# Patient Record
Sex: MALE | Race: WHITE | NOT HISPANIC OR LATINO | Employment: OTHER | URBAN - METROPOLITAN AREA
[De-identification: names, ages, dates, MRNs, and addresses within clinical notes are randomized per-mention and may not be internally consistent; named-entity substitution may affect disease eponyms.]

---

## 2023-07-11 ENCOUNTER — OFFICE VISIT (OUTPATIENT)
Dept: FAMILY MEDICINE | Facility: CLINIC | Age: 73
End: 2023-07-11
Payer: MEDICARE

## 2023-07-11 VITALS
OXYGEN SATURATION: 97 % | BODY MASS INDEX: 26.53 KG/M2 | WEIGHT: 179.1 LBS | HEIGHT: 69 IN | RESPIRATION RATE: 16 BRPM | SYSTOLIC BLOOD PRESSURE: 132 MMHG | DIASTOLIC BLOOD PRESSURE: 76 MMHG | TEMPERATURE: 97 F | HEART RATE: 72 BPM

## 2023-07-11 DIAGNOSIS — M25.522 LEFT ELBOW PAIN: ICD-10-CM

## 2023-07-11 DIAGNOSIS — Z23 NEED FOR SHINGLES VACCINE: ICD-10-CM

## 2023-07-11 DIAGNOSIS — R21 RASH: Primary | ICD-10-CM

## 2023-07-11 DIAGNOSIS — Z12.11 SCREEN FOR COLON CANCER: ICD-10-CM

## 2023-07-11 PROBLEM — R73.03 PREDIABETES: Status: ACTIVE | Noted: 2020-12-20

## 2023-07-11 PROBLEM — D17.9 LIPOMA: Status: ACTIVE | Noted: 2018-02-16

## 2023-07-11 PROBLEM — G14: Status: ACTIVE | Noted: 2018-02-22

## 2023-07-11 PROBLEM — Z87.891 HX OF SMOKING: Status: ACTIVE | Noted: 2018-02-16

## 2023-07-11 PROCEDURE — 99203 OFFICE O/P NEW LOW 30 MIN: CPT

## 2023-07-11 RX ORDER — TRIAMCINOLONE ACETONIDE 1 MG/G
CREAM TOPICAL 2 TIMES DAILY
Qty: 30 G | Refills: 0 | Status: SHIPPED | OUTPATIENT
Start: 2023-07-11 | End: 2024-06-14

## 2023-07-11 ASSESSMENT — ENCOUNTER SYMPTOMS: BACK PAIN: 1

## 2023-07-11 NOTE — PROGRESS NOTES
Assessment & Plan     Rash  Slightly raised scaly patches on upper back. Is scheduled with dermatology next week. Itching is the most bothersome symptom for him so will try topical steroid and follow up with derm.  - triamcinolone (KENALOG) 0.1 % external cream  Dispense: 30 g; Refill: 0    Left elbow pain  Started after overuse/strain with continued use. Discussed use of NSAIDS, rest, ice. Declined PT referral but will consider after trial of conservative measures.    Screen for colon cancer  - Colonoscopy Screening  Referral    Need for shingles vaccine  - zoster vaccine recombinant adjuvanted (SHINGRIX) injection  Dispense: 0.5 mL; Refill: 0    Plan to follow up for preventative care visit.     MICHELLE Loo CNP  [facility]   Edin is a 73 year old, presenting for the following health issues:  Establish Care (New patient, establish care), Derm Problem (Rash on back and has a growth on his back), Constipation (Chronic problem-most of life), Back Pain (Back pain X 1 month), and Elbow Pain (Left elbow pain)        7/11/2023     4:12 PM   Additional Questions   Roomed by Liberty   Accompanied by N/A         7/11/2023     4:13 PM   Patient Reported Additional Medications   Patient reports taking the following new medications OTC stool softener     Back Pain     Elbow Pain    History of Present Illness       Back Pain:  He presents for follow up of back pain. Patient's back pain is a recurring problem.  Location of back pain:  Left lower back  Description of back pain: stabbing  Back pain spreads: nowhere    Since patient first noticed back pain, pain is: gradually improving  Does back pain interfere with his job:  No      He eats 4 or more servings of fruits and vegetables daily.He consumes 4 sweetened beverage(s) daily.He exercises with enough effort to increase his heart rate 30 to 60 minutes per day.  He exercises with enough effort to increase his heart rate  "7 days per week.   He is taking medications regularly.       Edin presents to establish care and with concerns. He moved from [place] to [place] this past fall.    Complaining of a rash on back started a few months ago. He has not tried anything to help with the rash. Not painful but itchy. Any type of pressure on the area increases the itchy sensation. He has an appointment with [facility] next week to discuss this.     He is also complaining of left elbow pain. No specific injury but he reports it started after shoveling snow and chopping up ice this winter. More painful when he carries something heavy. 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 He is able to continue strength training (modified push ups, weight lifting). He has not tried anything to help with this pain.     Review of Systems   Musculoskeletal: Positive for back pain.      CONSTITUTIONAL: NEGATIVE for fever, chills, change in weight  ENT/MOUTH: NEGATIVE for ear, mouth and throat problems  RESP: NEGATIVE for significant cough or SOB  CV: NEGATIVE for chest pain, palpitations or peripheral edema      Objective    /76 (BP Location: Left arm, Patient Position: Sitting, Cuff Size: Adult Large)   Pulse 72   Temp 97  F (36.1  C) (Tympanic)   Resp 16   Ht 1.753 m (5' 9\")   Wt 81.2 kg (179 lb 1.6 oz)   SpO2 97%   BMI 26.45 kg/m    Body mass index is 26.45 kg/m .     Physical Exam     GENERAL: healthy, alert and no distress  NECK: no adenopathy, no asymmetry, masses, or scars and thyroid normal to palpation  RESP: lungs clear to auscultation - no rales, rhonchi or wheezes  CV: regular rate and rhythm, normal S1 S2, no S3 or S4, no murmur, click or rub, no peripheral edema and peripheral pulses strong  MS: no gross musculoskeletal defects noted, no edema. Full ROM of left elbow. Full strength. No erythema, edema.   SKIN: Dry raised pink scaly patches on upper back. Surrounding skin intact.       "

## 2023-09-02 ENCOUNTER — HEALTH MAINTENANCE LETTER (OUTPATIENT)
Age: 73
End: 2023-09-02

## 2023-09-19 ENCOUNTER — OFFICE VISIT (OUTPATIENT)
Dept: FAMILY MEDICINE | Facility: CLINIC | Age: 73
End: 2023-09-19
Payer: MEDICARE

## 2023-09-19 VITALS
BODY MASS INDEX: 27.43 KG/M2 | HEART RATE: 75 BPM | SYSTOLIC BLOOD PRESSURE: 119 MMHG | DIASTOLIC BLOOD PRESSURE: 78 MMHG | HEIGHT: 68 IN | RESPIRATION RATE: 12 BRPM | TEMPERATURE: 98.1 F | OXYGEN SATURATION: 97 % | WEIGHT: 181 LBS

## 2023-09-19 DIAGNOSIS — Z12.5 SCREENING FOR PROSTATE CANCER: ICD-10-CM

## 2023-09-19 DIAGNOSIS — S46.001S INJURY OF RIGHT ROTATOR CUFF, SEQUELA: ICD-10-CM

## 2023-09-19 DIAGNOSIS — Z87.891 HISTORY OF SMOKING: ICD-10-CM

## 2023-09-19 DIAGNOSIS — Z13.6 SCREENING FOR AAA (ABDOMINAL AORTIC ANEURYSM): ICD-10-CM

## 2023-09-19 DIAGNOSIS — Z23 NEED FOR SHINGLES VACCINE: ICD-10-CM

## 2023-09-19 DIAGNOSIS — R29.898 LEFT LEG WEAKNESS: ICD-10-CM

## 2023-09-19 DIAGNOSIS — R73.03 PREDIABETES: ICD-10-CM

## 2023-09-19 DIAGNOSIS — L20.84 INTRINSIC ECZEMA: ICD-10-CM

## 2023-09-19 DIAGNOSIS — Z00.00 ENCOUNTER FOR MEDICARE ANNUAL WELLNESS EXAM: Primary | ICD-10-CM

## 2023-09-19 DIAGNOSIS — Z11.59 NEED FOR HEPATITIS C SCREENING TEST: ICD-10-CM

## 2023-09-19 DIAGNOSIS — Z13.220 SCREENING FOR LIPID DISORDERS: ICD-10-CM

## 2023-09-19 DIAGNOSIS — M25.522 LEFT ELBOW PAIN: ICD-10-CM

## 2023-09-19 DIAGNOSIS — Z23 NEED FOR INFLUENZA VACCINATION: ICD-10-CM

## 2023-09-19 DIAGNOSIS — Z12.11 SCREEN FOR COLON CANCER: ICD-10-CM

## 2023-09-19 LAB
ERYTHROCYTE [DISTWIDTH] IN BLOOD BY AUTOMATED COUNT: 13.1 % (ref 10–15)
HBA1C MFR BLD: 6.2 % (ref 0–5.6)
HCT VFR BLD AUTO: 42.2 % (ref 40–53)
HGB BLD-MCNC: 14.3 G/DL (ref 13.3–17.7)
MCH RBC QN AUTO: 30.6 PG (ref 26.5–33)
MCHC RBC AUTO-ENTMCNC: 33.9 G/DL (ref 31.5–36.5)
MCV RBC AUTO: 90 FL (ref 78–100)
PLATELET # BLD AUTO: 222 10E3/UL (ref 150–450)
RBC # BLD AUTO: 4.67 10E6/UL (ref 4.4–5.9)
WBC # BLD AUTO: 5.6 10E3/UL (ref 4–11)

## 2023-09-19 PROCEDURE — G0008 ADMIN INFLUENZA VIRUS VAC: HCPCS

## 2023-09-19 PROCEDURE — G0438 PPPS, INITIAL VISIT: HCPCS

## 2023-09-19 PROCEDURE — 86803 HEPATITIS C AB TEST: CPT

## 2023-09-19 PROCEDURE — 36415 COLL VENOUS BLD VENIPUNCTURE: CPT

## 2023-09-19 PROCEDURE — G0103 PSA SCREENING: HCPCS

## 2023-09-19 PROCEDURE — 85027 COMPLETE CBC AUTOMATED: CPT

## 2023-09-19 PROCEDURE — 83036 HEMOGLOBIN GLYCOSYLATED A1C: CPT

## 2023-09-19 PROCEDURE — 99213 OFFICE O/P EST LOW 20 MIN: CPT | Mod: 25

## 2023-09-19 PROCEDURE — 90662 IIV NO PRSV INCREASED AG IM: CPT

## 2023-09-19 PROCEDURE — 80061 LIPID PANEL: CPT

## 2023-09-19 PROCEDURE — 80053 COMPREHEN METABOLIC PANEL: CPT

## 2023-09-19 RX ORDER — TRIAMCINOLONE ACETONIDE 1 MG/G
OINTMENT TOPICAL 2 TIMES DAILY PRN
COMMUNITY
Start: 2023-08-07 | End: 2024-06-14

## 2023-09-19 ASSESSMENT — ENCOUNTER SYMPTOMS
HEMATOCHEZIA: 0
ABDOMINAL PAIN: 0
HEARTBURN: 0
PARESTHESIAS: 0
FEVER: 0
ARTHRALGIAS: 1
DYSURIA: 0
HEMATURIA: 0
EYE PAIN: 0
MYALGIAS: 1
CHILLS: 0
WEAKNESS: 1
DIARRHEA: 0
SHORTNESS OF BREATH: 0
COUGH: 0
HEADACHES: 0
PALPITATIONS: 0
NAUSEA: 0
DIZZINESS: 0
SORE THROAT: 0
NERVOUS/ANXIOUS: 0
JOINT SWELLING: 0
CONSTIPATION: 1

## 2023-09-19 ASSESSMENT — ACTIVITIES OF DAILY LIVING (ADL): CURRENT_FUNCTION: NO ASSISTANCE NEEDED

## 2023-09-19 NOTE — NURSING NOTE
Hpi Title: Evaluation of Skin Lesions Prior to immunization administration, verified patients identity using patient s name and date of birth. Please see Immunization Activity for additional information.     Screening Questionnaire for Adult Immunization    Are you sick today?   No   Do you have allergies to medications, food, a vaccine component or latex?   No   Have you ever had a serious reaction after receiving a vaccination?   No   Do you have a long-term health problem with heart, lung, kidney, or metabolic disease (e.g., diabetes), asthma, a blood disorder, no spleen, complement component deficiency, a cochlear implant, or a spinal fluid leak?  Are you on long-term aspirin therapy?   No   Do you have cancer, leukemia, HIV/AIDS, or any other immune system problem?   No   Do you have a parent, brother, or sister with an immune system problem?   Yes   In the past 3 months, have you taken medications that affect  your immune system, such as prednisone, other steroids, or anticancer drugs; drugs for the treatment of rheumatoid arthritis, Crohn s disease, or psoriasis; or have you had radiation treatments?   No   Have you had a seizure, or a brain or other nervous system problem?   No   During the past year, have you received a transfusion of blood or blood    products, or been given immune (gamma) globulin or antiviral drug?   No   For women: Are you pregnant or is there a chance you could become       pregnant during the next month?   No   Have you received any vaccinations in the past 4 weeks?   No     Immunization questionnaire was positive for at least one answer.  Notified yes.      Patient instructed to remain in clinic for 15 minutes afterwards, and to report any adverse reactions.     Screening performed by Cristy Edouard MA on 9/19/2023 at 5:04 PM.        Year Removed: 1900 Have Your Spot(S) Been Treated In The Past?: has not been treated Hpi Title: Evaluation of a Skin Lesion Year Removed: 1900

## 2023-09-19 NOTE — PATIENT INSTRUCTIONS
Patient Education   Personalized Prevention Plan  You are due for the preventive services outlined below.  Your care team is available to assist you in scheduling these services.  If you have already completed any of these items, please share that information with your care team to update in your medical record.  Health Maintenance Due   Topic Date Due     Colorectal Cancer Screening  Never done     Hepatitis C Screening  Never done     Cholesterol Lab  Never done     Zoster (Shingles) Vaccine (1 of 2) Never done     AORTIC ANEURYSM SCREENING (SYSTEM ASSIGNED)  Never done     COVID-19 Vaccine (3 - Pfizer series) 04/08/2021     Flu Vaccine (1) 09/01/2023     Preventing Falls: Care Instructions    Talk to your doctor about the medicines you take. Ask if any of them increase the risk of falls and whether they can be changed or stopped.   Try to exercise regularly. It can help improve your strength and balance. This can help lower your risk of falling.     Practice fall safety and prevention.    Wear low-heeled shoes that fit well and give your feet good support. Talk to your doctor if you have foot problems that make this hard.  Carry a cellphone or wear a medical alert device that you can use to call for help.  Use stepladders instead of chairs to reach high objects. Don't climb if you're at risk for falls. Ask for help, if needed.  Wear the correct eyeglasses, if you need them.    Make your home safer.    Remove rugs, cords, clutter, and furniture from walkways.  Keep your house well lit. Use night-lights in hallways and bathrooms.  Install and use sturdy handrails on stairways.  Wear nonskid footwear, even inside. Don't walk barefoot or in socks without shoes.    Be safe outside.    Use handrails, curb cuts, and ramps whenever possible.  Keep your hands free by using a shoulder bag or backpack.  Try to walk in well-lit areas. Watch out for uneven ground, changes in pavement, and debris.  Be careful in the winter.  "Walk on the grass or gravel when sidewalks are slippery. Use de-icer on steps and walkways. Add non-slip devices to shoes.    Put grab bars and nonskid mats in your shower or tub and near the toilet. Try to use a shower chair or bath bench when bathing.   Get into a tub or shower by putting in your weaker leg first. Get out with your strong side first. Have a phone or medical alert device in the bathroom with you.   Where can you learn more?  Go to https://www.[employer].net/patiented  Enter G117 in the search box to learn more about \"Preventing Falls: Care Instructions.\"  Current as of: November 9, 2022               Content Version: 13.7    0987-1072 [employer].   Care instructions adapted under license by your healthcare professional. If you have questions about a medical condition or this instruction, always ask your healthcare professional. [employer] disclaims any warranty or liability for your use of this information.      How to Get Up Safely After a Fall: Care Instructions  Overview     If you have injuries, health problems, or other reasons that may make it easy for you to fall at home, it is a good idea to learn how to get up safely after a fall. Learning how to get up correctly can help you avoid making an injury worse.  Also, knowing what to do if you cannot get up can help you stay safe until help arrives.  Follow-up care is a key part of your treatment and safety. Be sure to make and go to all appointments, and call your doctor if you are having problems. It's also a good idea to know your test results and keep a list of the medicines you take.  How can you care for yourself after a fall?  If you think you can get up  First lie still for a few minutes and think about how you feel. If your body feels okay and you think you can get up safely, follow the rest of the steps below:  Look for a chair or other piece of furniture that is close to you.  Roll onto your side and rest. " Roll by turning your head in the direction you want to roll, move your shoulder and arm, then hip and leg in the same direction.  Lie still for a moment to let your blood pressure adjust.  Slowly push your upper body up, lift your head, and take a moment to rest.  Slowly get up on your hands and knees, and crawl to the chair or other stable piece of furniture.  Put your hands on the chair.  Move one foot forward, and place it flat on the floor. Your other leg should be bent with the knee on the floor.  Rise slowly, turn your body, and sit in the chair. Stay seated for a bit and think about how you feel. Call for help. Even if you feel okay, let someone know what happened to you. You might not know that you have a serious injury.  If you cannot get up  If you think you are injured after a fall or you cannot get up, try not to panic.  Call out for help.  If you have a phone within reach or you have an emergency call device, use it to call for help.  If you do not have a phone within reach, try to slide yourself toward it. If you cannot get to the phone, try to slide toward a door or window or a place where you think you can be heard.  Chaves or use an object to make noise so someone might hear you.  If you can reach something that you can use for a pillow, place it under your head. Try to stay warm by covering yourself with a blanket or clothing while you wait for help.  When should you call for help?   Call 911 anytime you think you may need emergency care. For example, call if:    You passed out (lost consciousness).     You cannot get up after a fall.     You have severe pain.   Call your doctor now or seek immediate medical care if:    You have new or worse pain.     You are dizzy or lightheaded.     You hit your head.   Watch closely for changes in your health, and be sure to contact your doctor if:    You do not get better as expected.   Where can you learn more?  Go to https://www.healthwise.net/patiented  Enter  "G513 in the search box to learn more about \"How to Get Up Safely After a Fall: Care Instructions.\"  Current as of: November 14, 2022               Content Version: 13.7 2006-2023 [employer].   Care instructions adapted under license by your healthcare professional. If you have questions about a medical condition or this instruction, always ask your healthcare professional. [employer] disclaims any warranty or liability for your use of this information.         "

## 2023-09-19 NOTE — PROGRESS NOTES
SUBJECTIVE:   Edin is a 73 year old who presents for Preventive Visit.      9/19/2023     3:58 PM   Additional Questions   Roomed by lesli stinson   Accompanied by alone         9/19/2023     3:58 PM   Patient Reported Additional Medications   Patient reports taking the following new medications none       Are you in the first 12 months of your Medicare coverage?  No    HPI    Edin presents today for an annual wellness. He is retired.  For exercise he has been walking the dog, going to gym, sits ups, and push ups. Has been feeling more weak/unsteady, especially  his left leg as he had polio as a child and it affected this leg.    Was seen previously for left elbow injury after oversue shoveling snow this past winter. Continues to bother him at times    He has been seeing dermatology for a rash that he developed. He is seeing dermatology with next appointment tomorrow.     Have you ever done Advance Care Planning? (For example, a Health Directive, POLST, or a discussion with a medical provider or your loved ones about your wishes): No, advance care planning information given to patient to review.  Patient declined advance care planning discussion at this time.      Fall risk  Fallen 2 or more times in the past year?: Yes  Any fall with injury in the past year?: No    Cognitive Screening   1) Repeat 3 items (Leader, Season, Table)    2) Clock draw: NORMAL  3) 3 item recall: Recalls 3 objects  Results: 3 items recalled: COGNITIVE IMPAIRMENT LESS LIKELY    Mini-CogTM Copyright S [employer]. Licensed by the author for use in [facility]; reprinted with permission (keysha@.[facility]). All rights reserved.        Reviewed and updated as needed this visit by clinical staff   Tobacco  Allergies  Meds  Problems  Med Hx  Surg Hx  Fam Hx          Reviewed and updated as needed this visit by Provider   Tobacco  Allergies  Meds  Problems  Med Hx  Surg Hx  Fam Hx         Social History     Tobacco Use    Smoking  status: Never    Smokeless tobacco: Never   Substance Use Topics    Alcohol use: Not on file           9/19/2023     3:52 PM   Alcohol Use   Prescreen: >3 drinks/day or >7 drinks/week? No     Do you have a current opioid prescription? No  Do you use any other controlled substances or medications that are not prescribed by a provider? None  {Provider  If there are gaps in the social history shown above, please follow the link and refresh the note Link to Social and Substance History     Current providers sharing in care for this patient include:   Patient Care Team:  No Ref-Primary, Physician as PCP - Leslie Donaldson APRN CNP as Assigned PCP    The following health maintenance items are reviewed in Epic and correct as of today:  Health Maintenance   Topic Date Due    COLORECTAL CANCER SCREENING  Never done    HEPATITIS C SCREENING  Never done    LIPID  Never done    ZOSTER IMMUNIZATION (1 of 2) Never done    AORTIC ANEURYSM SCREENING (SYSTEM ASSIGNED)  Never done    COVID-19 Vaccine (3 - Pfizer series) 04/08/2021    INFLUENZA VACCINE (1) 09/01/2023    ANNUAL REVIEW OF HM ORDERS  07/11/2024    MEDICARE ANNUAL WELLNESS VISIT  09/19/2024    FALL RISK ASSESSMENT  09/19/2024    DTAP/TDAP/TD IMMUNIZATION (2 - Td or Tdap) 02/09/2028    ADVANCE CARE PLANNING  09/19/2028    PHQ-2 (once per calendar year)  Completed    Pneumococcal Vaccine: 65+ Years  Completed    IPV IMMUNIZATION  Aged Out    HPV IMMUNIZATION  Aged Out    MENINGITIS IMMUNIZATION  Aged Out     BP Readings from Last 3 Encounters:   09/19/23 119/78   07/11/23 132/76    Wt Readings from Last 3 Encounters:   09/19/23 82.1 kg (181 lb)   07/11/23 81.2 kg (179 lb 1.6 oz)                          Review of Systems  Constitutional, HEENT, cardiovascular, pulmonary, gi and gu systems are negative, except as otherwise noted.    OBJECTIVE:   /78 (BP Location: Left arm, Patient Position: Sitting, Cuff Size: Adult Regular)   Pulse 75   Temp 98.1  F (36.7  C)  "(Oral)   Resp 12   Ht 1.727 m (5' 8\")   Wt 82.1 kg (181 lb)   SpO2 97%   BMI 27.52 kg/m   Estimated body mass index is 27.52 kg/m  as calculated from the following:    Height as of this encounter: 1.727 m (5' 8\").    Weight as of this encounter: 82.1 kg (181 lb).  Physical Exam  GENERAL: healthy, alert and no distress  EYES: Eyes grossly normal to inspection, PERRL and conjunctivae and sclerae normal  HENT: ear canals and TM's normal, nose and mouth without ulcers or lesions  NECK: no adenopathy, no asymmetry, masses, or scars and thyroid normal to palpation  RESP: lungs clear to auscultation - no rales, rhonchi or wheezes  CV: regular rate and rhythm, normal S1 S2, no S3 or S4, no murmur, click or rub, no peripheral edema and peripheral pulses strong  ABDOMEN: soft, nontender, no hepatosplenomegaly, no masses and bowel sounds normal  MS: no gross musculoskeletal defects noted, no edema  SKIN: Erythematous scaling patches on elbows, back.   NEURO: Normal strength and tone, mentation intact and speech normal  PSYCH: mentation appears normal, affect normal/bright        ASSESSMENT / PLAN:   Edin was seen today for wellness visit and recheck medication.    Diagnoses and all orders for this visit:    1. Encounter for Medicare annual wellness exam  Preventative exam completed today. Discussed healthy lifestyle recommendations of getting 150 minutes of exercise weekly and eating a healthy diet. Reviewed and updated health maintenance. Immunizations/Labs completed today. Follow up in one year for AWV.   - Lipid panel reflex to direct LDL Non-fasting; Future  - INFLUENZA VACCINE 65+ (FLUZONE HD)  - Lipid panel reflex to direct LDL Non-fasting  - CBC with platelets  - Comprehensive metabolic panel (BMP + Alb, Alk Phos, ALT, AST, Total. Bili, TP)    2. Intrinsic eczema  Seeing dermatology. Currently prescribed triamcinolone. Counseled on keeping his skin moisturized. Has follow up with dermatology tomorrow.     3. " "Left leg weakness  History of polio with left leg weakness. He does some strength exercises at the gym but he starting to feel more unsteady. Will refer to PT.  - Physical Therapy Referral; Future    4. Left elbow pain  Probable tendinitis after shoveling snow this winter that continues to flare. Counseled on ice, rest, NSAIDS. PT order placed.  - Physical Therapy Referral; Future    5. Injury of right rotator cuff, sequela  History of rotator cuff injury. Plays the cello. He would like PT to eval this as well. Has had PT for this previously.   - Physical Therapy Referral; Future    6. Need for shingles vaccine  Already had order at pharmacy. Reminded him to complete this.     7. Screening for AAA (abdominal aortic aneurysm)  - Abdominal Aortic Aneurysm Screening/Tracking  - US Abdominal Aorta Imaging; Future    8. Prediabetes  Last A1c 6.1. Check today.   - Hemoglobin A1c    9. Screen for colon cancer  Already has referral for colonoscopy. Did give him the phone number so that he can call and schedule this.     10. Need for hepatitis C screening test  - Hepatitis C Screen Reflex to HCV RNA Quant and Genotype    11. History of smoking  - Abdominal Aortic Aneurysm Screening/Tracking    12. Screening for prostate cancer  - PSA, screen    13. Screening for lipid disorders  - Lipid panel reflex to direct LDL Non-fasting    14. Need for influenza vaccination  - INFLUENZA VACCINE 65+ (FLUZONE HD)               COUNSELING:  Reviewed preventive health counseling, as reflected in patient instructions       Consider AAA screening for ages 65-75 and smoking history       Regular exercise       Healthy diet/nutrition       Dental care       Fall risk prevention       Colon cancer screening       Prostate cancer screening      BMI:   Estimated body mass index is 27.52 kg/m  as calculated from the following:    Height as of this encounter: 1.727 m (5' 8\").    Weight as of this encounter: 82.1 kg (181 lb).       He reports that he " has never smoked. He has never used smokeless tobacco.    Appropriate preventive services were discussed with this patient, including applicable screening as appropriate for cardiovascular disease, diabetes, osteopenia/osteoporosis, and glaucoma.  As appropriate for age/gender, discussed screening for colorectal cancer, prostate cancer, breast cancer, and cervical cancer. Checklist reviewing preventive services available has been given to the patient.    Reviewed patients plan of care and provided an AVS. The Basic Care Plan (routine screening as documented in Health Maintenance) for Edin meets the Care Plan requirement. This Care Plan has been established and reviewed with the Patient.      MICHELLE Loo [facility]    Identified Health Risks:  I have reviewed Opioid Use Disorder and Substance Use Disorder risk factors and made any needed referrals.   He is at risk for falling and has been provided with information to reduce the risk of falling at home.

## 2023-09-19 NOTE — PROGRESS NOTES
"SUBJECTIVE:   Edin is a 73 year old who presents for Preventive Visit.      9/19/2023     3:58 PM   Additional Questions   Roomed by lesli stinson   Accompanied by alone         9/19/2023     3:58 PM   Patient Reported Additional Medications   Patient reports taking the following new medications none       Are you in the first 12 months of your Medicare coverage?  No    Healthy Habits:     In general, how would you rate your overall health?  Excellent    Frequency of exercise:  6-7 days/week    Duration of exercise:  30-45 minutes    Do you usually eat at least 4 servings of fruit and vegetables a day, include whole grains    & fiber and avoid regularly eating high fat or \"junk\" foods?  Yes    Taking medications regularly:  Not Applicable    Medication side effects:  Not applicable    Ability to successfully perform activities of daily living:  No assistance needed    Home Safety:  Lack of grab bars in the bathroom    Hearing Impairment:  No hearing concerns    In the past 6 months, have you been bothered by leaking of urine?  No    In general, how would you rate your overall mental or emotional health?  Excellent    Additional concerns today:  Yes        Have you ever done Advance Care Planning? (For example, a Health Directive, POLST, or a discussion with a medical provider or your loved ones about your wishes): No, advance care planning information given to patient to review.  Patient declined advance care planning discussion at this time.    {Hearing Test Done (Optional):433036}   Fall risk  { :888417}  {If any of the above assessments are answered yes, consider ordering appropriate referrals (Optional):309475}  Cognitive Screening { :351456}    Do you have sleep apnea, excessive snoring or daytime drowsiness? : no    Reviewed and updated as needed this visit by clinical staff   Tobacco  Allergies  Meds              Reviewed and updated as needed this visit by Provider                 Social History     Tobacco " Use    Smoking status: Never    Smokeless tobacco: Never   Substance Use Topics    Alcohol use: Not on file     {Rooming staff  Click this link to complete the Prescreen if response below is not for today's visit  Alcohol Use Prescreen >3 drinks/day or > 7 drinks/week.  If the prescreen question answer is YES, complete the full AUDIT  :587885}        9/19/2023     3:52 PM   Alcohol Use   Prescreen: >3 drinks/day or >7 drinks/week? No   {add AUDIT responses (Optional) (A score of 7 for adult men is an indication of hazardous drinking; a score of 8 or more is an indication of an alcohol use disorder.  A score of 7 or more for adult women is an indication of hazardous drinking or an alchohol use disorder):309717}  Do you have a current opioid prescription? No  Do you use any other controlled substances or medications that are not prescribed by a provider? None  {Provider  If there are gaps in the social history shown above, please follow the link and refresh the note Link to Social and Substance History :759770}    {Outside tests to abstract? :884544}    {additional problems to add (Optional):085407}    Current providers sharing in care for this patient include: {Rooming staff:  Please update Care Team from storyboard, refresh this note and then delete this statement}  Patient Care Team:  No Ref-Primary, Physician as PCP - Leslie Donaldson APRN CNP as Assigned PCP    The following health maintenance items are reviewed in Epic and correct as of today:  Health Maintenance   Topic Date Due    ADVANCE CARE PLANNING  Never done    COLORECTAL CANCER SCREENING  Never done    HEPATITIS C SCREENING  Never done    LIPID  Never done    ZOSTER IMMUNIZATION (1 of 2) Never done    MEDICARE ANNUAL WELLNESS VISIT  Never done    AORTIC ANEURYSM SCREENING (SYSTEM ASSIGNED)  Never done    COVID-19 Vaccine (3 - Pfizer series) 04/08/2021    INFLUENZA VACCINE (1) 09/01/2023    ANNUAL REVIEW OF HM ORDERS  07/11/2024    FALL RISK  "ASSESSMENT  2024    DTAP/TDAP/TD IMMUNIZATION (2 - Td or Tdap) 2028    PHQ-2 (once per calendar year)  Completed    Pneumococcal Vaccine: 65+ Years  Completed    IPV IMMUNIZATION  Aged Out    HPV IMMUNIZATION  Aged Out    MENINGITIS IMMUNIZATION  Aged Out     {Chronicprobdata (optional):858976}  {Decision Support (Optional):826793}        Review of Systems   Constitutional:  Negative for chills and fever.   HENT:  Negative for congestion, ear pain, hearing loss and sore throat.    Eyes:  Negative for pain and visual disturbance.   Respiratory:  Negative for cough and shortness of breath.    Cardiovascular:  Negative for chest pain, palpitations and peripheral edema.   Gastrointestinal:  Positive for constipation. Negative for abdominal pain, diarrhea, heartburn, hematochezia and nausea.   Genitourinary:  Negative for dysuria, genital sores, hematuria, impotence, penile discharge and urgency.   Musculoskeletal:  Positive for arthralgias and myalgias. Negative for joint swelling.   Skin:  Positive for rash.   Neurological:  Positive for weakness. Negative for dizziness, headaches and paresthesias.   Psychiatric/Behavioral:  Negative for mood changes. The patient is not nervous/anxious.      {ROS COMP (Optional):056717}    OBJECTIVE:   /78 (BP Location: Left arm, Patient Position: Sitting, Cuff Size: Adult Regular)   Pulse 75   Temp 98.1  F (36.7  C) (Oral)   Resp 12   Ht 1.727 m (5' 8\")   Wt 82.1 kg (181 lb)   SpO2 97%   BMI 27.52 kg/m   Estimated body mass index is 27.52 kg/m  as calculated from the following:    Height as of this encounter: 1.727 m (5' 8\").    Weight as of this encounter: 82.1 kg (181 lb).  Physical Exam  {Exam (Optional) :807798}    {Diagnostic Test Results (Optional):770077}    ASSESSMENT / PLAN:   {Diag Picklist:259374}    {Patient advised of split billing (Optional):036654}      COUNSELING:  {Medicare Counselin}      BMI:   Estimated body mass index is 27.52 " "kg/m  as calculated from the following:    Height as of this encounter: 1.727 m (5' 8\").    Weight as of this encounter: 82.1 kg (181 lb).   {Weight Management Plan needed for ACO:226029}      He reports that he has never smoked. He has never used smokeless tobacco.      Appropriate preventive services were discussed with this patient, including applicable screening as appropriate for cardiovascular disease, diabetes, osteopenia/osteoporosis, and glaucoma.  As appropriate for age/gender, discussed screening for colorectal cancer, prostate cancer, breast cancer, and cervical cancer. Checklist reviewing preventive services available has been given to the patient.    Reviewed patients plan of care and provided an AVS. The {CarePlan:431761} for Edin meets the Care Plan requirement. This Care Plan has been established and reviewed with the {PATIENT, FAMILY MEMBER, CAREGIVER:345018}.    {Counseling Resources  [place] Preventive Services Task Force  Cholesterol Screening  Health diet/nutrition  Pooled Cohorts Equation Calculator  [employer]'s MyPlate  ASA Prophylaxis  Lung CA Screening  Osteoporosis prevention/bone health :380353}  {Prostate Cancer Screening  Consider for men 55-69 per guidance from USPSTF :360993}    MICHELLE Loo CNP  [facility]    Identified Health Risks:  {Medicare required documentation of substance and opioid use disorders screening :931807}  "

## 2023-09-20 LAB
ALBUMIN SERPL BCG-MCNC: 4.5 G/DL (ref 3.5–5.2)
ALP SERPL-CCNC: 45 U/L (ref 40–129)
ALT SERPL W P-5'-P-CCNC: 16 U/L (ref 0–70)
ANION GAP SERPL CALCULATED.3IONS-SCNC: 11 MMOL/L (ref 7–15)
AST SERPL W P-5'-P-CCNC: 20 U/L (ref 0–45)
BILIRUB SERPL-MCNC: 0.3 MG/DL
BUN SERPL-MCNC: 13.5 MG/DL (ref 8–23)
CALCIUM SERPL-MCNC: 9.6 MG/DL (ref 8.8–10.2)
CHLORIDE SERPL-SCNC: 105 MMOL/L (ref 98–107)
CHOLEST SERPL-MCNC: 176 MG/DL
CREAT SERPL-MCNC: 0.77 MG/DL (ref 0.67–1.17)
DEPRECATED HCO3 PLAS-SCNC: 24 MMOL/L (ref 22–29)
EGFRCR SERPLBLD CKD-EPI 2021: >90 ML/MIN/1.73M2
GLUCOSE SERPL-MCNC: 89 MG/DL (ref 70–99)
HDLC SERPL-MCNC: 34 MG/DL
LDLC SERPL CALC-MCNC: 111 MG/DL
NONHDLC SERPL-MCNC: 142 MG/DL
POTASSIUM SERPL-SCNC: 4.2 MMOL/L (ref 3.4–5.3)
PROT SERPL-MCNC: 7.3 G/DL (ref 6.4–8.3)
PSA SERPL DL<=0.01 NG/ML-MCNC: 3.77 NG/ML (ref 0–6.5)
SODIUM SERPL-SCNC: 140 MMOL/L (ref 136–145)
TRIGL SERPL-MCNC: 153 MG/DL

## 2023-09-21 LAB — HCV AB SERPL QL IA: NONREACTIVE

## 2024-04-01 ENCOUNTER — TELEPHONE (OUTPATIENT)
Dept: GASTROENTEROLOGY | Facility: CLINIC | Age: 74
End: 2024-04-01
Payer: COMMERCIAL

## 2024-04-01 NOTE — TELEPHONE ENCOUNTER
"Endoscopy Scheduling Screen    Have you had a positive Covid test in the last 14 days?  No      What is your communication preference for Instructions and/or Bowel Prep?   mail      What insurance is in the chart?  Other:  [facility] advantage medicare  [facility] ADVANTAGE   ID - [id number]  [facility] - [id number]  1/1/24  Ordering/Referring Provider: DIONNA AMANDA   (If ordering provider performs procedure, schedule with ordering provider unless otherwise instructed. )    BMI: Estimated body mass index is 27.52 kg/m  as calculated from the following:    Height as of 9/19/23: 1.727 m (5' 8\").    Weight as of 9/19/23: 82.1 kg (181 lb).     Sedation Ordered  moderate sedation.   If patient BMI > 50 do not schedule in ASC.    If patient BMI > 45 do not schedule at [facility].    Are you taking methadone or Suboxone?  No    Have you had difficulties, pain, or discomfort during past endoscopy procedures?  No    Are you taking any prescription medications for pain 3 or more times per week?   NO, No RN review required.      Do you have a history of malignant hyperthermia?  No      (Females) Are you currently pregnant?          Have you been diagnosed or told you have pulmonary hypertension?   No      Do you have an LVAD?  No      Have you been told you have moderate to severe sleep apnea?  No    Have you been told you have COPD, asthma, or any other lung disease?  No      Do you have any heart conditions?  No       Have you ever had or are you waiting for an organ transplant?  No. Continue scheduling, no site restrictions.      Have you had a stroke or transient ischemic attack (TIA aka \"mini stroke\" in the last 6 months?   No      Have you been diagnosed with or been told you have cirrhosis of the liver?   No      Are you currently on dialysis?   No      Do you need assistance transferring?   No    BMI: Estimated body mass index is 27.52 kg/m  as calculated from the following:    Height as of 9/19/23: 1.727 m (5' 8\").    Weight as of 9/19/23: " 82.1 kg (181 lb).     Is patients BMI > 40 and scheduling location [facility]?  No      Do you take an injectable medication for weight loss or diabetes (excluding insulin)?  No    Do you take the medication Naltrexone?  No    Do you take blood thinners?  No       Prep   Are you currently on dialysis or do you have chronic kidney disease?  No    Do you have a diagnosis of diabetes?  No    Do you have a diagnosis of cystic fibrosis (CF)?  No    On a regular basis do you go 3 -5 days between bowel movements?  No    BMI > 40?  No    Preferred Pharmacy:    [id number] - [place], [place] - [address] [place] [address]  Phone: [phone] Fax: [phone]      Final Scheduling Details     Procedure scheduled  Colonoscopy    Surgeon:  KENDRICK     Date of procedure:  6/5/24     Pre-OP / PAC:   No - Not required for this site.    Location  [facility]  1st available    Sedation   MAC/Deep Sedation - Patient preference.      Patient Reminders:   You will receive a call from a Nurse to review instructions and health history.  This assessment must be completed prior to your procedure.  Failure to complete the Nurse assessment may result in the procedure being cancelled.      On the day of your procedure, please designate an adult(s) who can drive you home stay with you for the next 24 hours. The medicines used in the exam will make you sleepy. You will not be able to drive.      You cannot take public transportation, ride share services, or non-medical taxi service without a responsible caregiver.  Medical transport services are allowed with the requirement that a responsible caregiver will receive you at your destination.  We require that drivers and caregivers are confirmed prior to your procedure.

## 2024-05-01 ENCOUNTER — TRANSFERRED RECORDS (OUTPATIENT)
Dept: HEALTH INFORMATION MANAGEMENT | Facility: CLINIC | Age: 74
End: 2024-05-01
Payer: COMMERCIAL

## 2024-05-03 ENCOUNTER — TRANSFERRED RECORDS (OUTPATIENT)
Dept: HEALTH INFORMATION MANAGEMENT | Facility: CLINIC | Age: 74
End: 2024-05-03

## 2024-05-23 ENCOUNTER — TELEPHONE (OUTPATIENT)
Dept: GASTROENTEROLOGY | Facility: CLINIC | Age: 74
End: 2024-05-23

## 2024-05-23 ENCOUNTER — TELEPHONE (OUTPATIENT)
Dept: GASTROENTEROLOGY | Facility: CLINIC | Age: 74
End: 2024-05-23
Payer: COMMERCIAL

## 2024-05-23 NOTE — TELEPHONE ENCOUNTER
Attempted to contact patient in order to complete pre assessment questions.     Patient needs to cancel this appt.  Endoscopy scheduling has been notified.     Patient was seen at [facility] on 5.1.2024 for a colonoscopy.  Per colonoscopy report pt should repeat in 1 year.  Pt expressed concern that they were not optimally cleaned out and perhaps should have this done sooner.  Pt is establishing with a PCP in June.  RN advised pt discuss with PCP.  If another colonoscopy is warranted sooner they can place a referral at that time.    Procedure details:    Patient scheduled for Colonoscopy  on 6.5.2024.     Arrival time: 1300. Procedure time 1400    Facility location: [facility]; [address], [place] [address]. Check in location: 2nd Floor.    Sedation type: MAC    Pre op exam needed? N/A    Indication for procedure: screening colonoscopy      Chart review:     Electronic implanted devices? No    Recent diagnosis of diverticulitis within the last 6 weeks? No    Diabetic? Prediabetic.       Medication review:    Anticoagulants? No    NSAIDS? No NSAID medications per patient's medication list.  RN will verify with pre-assessment call.    Other medication HOLDING recommendations:  N/A      Prep for procedure:     Bowel prep recommendation:  Per 5.1.20204 colonoscopy report pt needs to take miralax daily x 1 week prior to procedure in addition to bowel prep.        Milly Ribeiro RN  Endoscopy Procedure Pre Assessment RN         
1.52

## 2024-05-23 NOTE — TELEPHONE ENCOUNTER
Caller: Ji Ribeiro, RN to Edin Sousa      Reason for Reschedule/Cancellation   (please be detailed, any staff messages or encounters to note?): no longer needed, completed at outside locale 5/1/24 per pt & instructed to have follow-up scope 1 year from that.      Prior to reschedule please review:  Ordering Provider: DIONNA AMANDA  Sedation Determined: MAC per locale  Does patient have any ASC Exclusions, please identify?: NO      Notes on Cancelled Procedure:  Procedure: Lower Endoscopy [Colonoscopy]   Date: 6/5/24  Location: [facility]; [address] [place] [address] [address] [place] [address]  Surgeon: KENDRICK      Rescheduled: No, SEEN ELSEWHERE SOONER       Did you cancel or rescheduled an [facility] procedure? No.

## 2024-06-14 ENCOUNTER — OFFICE VISIT (OUTPATIENT)
Dept: FAMILY MEDICINE | Facility: CLINIC | Age: 74
End: 2024-06-14
Payer: COMMERCIAL

## 2024-06-14 VITALS
HEART RATE: 75 BPM | HEIGHT: 69 IN | BODY MASS INDEX: 26.73 KG/M2 | WEIGHT: 180.5 LBS | TEMPERATURE: 97.8 F | OXYGEN SATURATION: 96 % | DIASTOLIC BLOOD PRESSURE: 77 MMHG | SYSTOLIC BLOOD PRESSURE: 131 MMHG | RESPIRATION RATE: 18 BRPM

## 2024-06-14 DIAGNOSIS — M25.561 RIGHT KNEE PAIN, UNSPECIFIED CHRONICITY: ICD-10-CM

## 2024-06-14 DIAGNOSIS — Z87.891 FORMER SMOKER: ICD-10-CM

## 2024-06-14 DIAGNOSIS — M79.671 RIGHT FOOT PAIN: ICD-10-CM

## 2024-06-14 DIAGNOSIS — R73.03 PREDIABETES: ICD-10-CM

## 2024-06-14 DIAGNOSIS — M79.672 LEFT FOOT PAIN: ICD-10-CM

## 2024-06-14 DIAGNOSIS — G14 POSTPOLIOMYELITIS SYNDROME (H): ICD-10-CM

## 2024-06-14 DIAGNOSIS — L84 CALLUS OF FOOT: ICD-10-CM

## 2024-06-14 DIAGNOSIS — R35.1 NOCTURIA: ICD-10-CM

## 2024-06-14 DIAGNOSIS — R79.9 ABNORMAL FINDING OF BLOOD CHEMISTRY, UNSPECIFIED: ICD-10-CM

## 2024-06-14 DIAGNOSIS — N40.0 ENLARGED PROSTATE: ICD-10-CM

## 2024-06-14 DIAGNOSIS — Z86.0101 HISTORY OF ADENOMATOUS POLYP OF COLON: ICD-10-CM

## 2024-06-14 DIAGNOSIS — R25.2 MUSCLE CRAMPS: ICD-10-CM

## 2024-06-14 DIAGNOSIS — K57.30 DIVERTICULAR DISEASE OF COLON: ICD-10-CM

## 2024-06-14 DIAGNOSIS — R13.10 DYSPHAGIA, UNSPECIFIED TYPE: ICD-10-CM

## 2024-06-14 DIAGNOSIS — N47.5 FORESKIN ADHESIONS: ICD-10-CM

## 2024-06-14 DIAGNOSIS — H93.13 TINNITUS, BILATERAL: ICD-10-CM

## 2024-06-14 DIAGNOSIS — Z87.2 HISTORY OF ECZEMA: ICD-10-CM

## 2024-06-14 DIAGNOSIS — K59.09 CHRONIC CONSTIPATION: Primary | ICD-10-CM

## 2024-06-14 DIAGNOSIS — M62.81 MUSCLE WEAKNESS (GENERALIZED): ICD-10-CM

## 2024-06-14 PROBLEM — Z72.0 TOBACCO USE: Status: ACTIVE | Noted: 2018-02-16

## 2024-06-14 LAB
BASOPHILS # BLD AUTO: 0.1 10E3/UL (ref 0–0.2)
BASOPHILS NFR BLD AUTO: 1 %
EOSINOPHIL # BLD AUTO: 0.4 10E3/UL (ref 0–0.7)
EOSINOPHIL NFR BLD AUTO: 6 %
ERYTHROCYTE [DISTWIDTH] IN BLOOD BY AUTOMATED COUNT: 13.5 % (ref 10–15)
HBA1C MFR BLD: 6.1 % (ref 0–5.6)
HCT VFR BLD AUTO: 43.2 % (ref 40–53)
HGB BLD-MCNC: 14.1 G/DL (ref 13.3–17.7)
IMM GRANULOCYTES # BLD: 0 10E3/UL
IMM GRANULOCYTES NFR BLD: 0 %
LYMPHOCYTES # BLD AUTO: 1.7 10E3/UL (ref 0.8–5.3)
LYMPHOCYTES NFR BLD AUTO: 25 %
MCH RBC QN AUTO: 29.7 PG (ref 26.5–33)
MCHC RBC AUTO-ENTMCNC: 32.6 G/DL (ref 31.5–36.5)
MCV RBC AUTO: 91 FL (ref 78–100)
MONOCYTES # BLD AUTO: 0.7 10E3/UL (ref 0–1.3)
MONOCYTES NFR BLD AUTO: 11 %
NEUTROPHILS # BLD AUTO: 3.8 10E3/UL (ref 1.6–8.3)
NEUTROPHILS NFR BLD AUTO: 58 %
PLATELET # BLD AUTO: 256 10E3/UL (ref 150–450)
RBC # BLD AUTO: 4.74 10E6/UL (ref 4.4–5.9)
WBC # BLD AUTO: 6.6 10E3/UL (ref 4–11)

## 2024-06-14 PROCEDURE — 99215 OFFICE O/P EST HI 40 MIN: CPT | Performed by: FAMILY MEDICINE

## 2024-06-14 PROCEDURE — 84100 ASSAY OF PHOSPHORUS: CPT | Performed by: FAMILY MEDICINE

## 2024-06-14 PROCEDURE — 83036 HEMOGLOBIN GLYCOSYLATED A1C: CPT | Performed by: FAMILY MEDICINE

## 2024-06-14 PROCEDURE — 82728 ASSAY OF FERRITIN: CPT | Performed by: FAMILY MEDICINE

## 2024-06-14 PROCEDURE — 83540 ASSAY OF IRON: CPT | Performed by: FAMILY MEDICINE

## 2024-06-14 PROCEDURE — 36415 COLL VENOUS BLD VENIPUNCTURE: CPT | Performed by: FAMILY MEDICINE

## 2024-06-14 PROCEDURE — 83735 ASSAY OF MAGNESIUM: CPT | Performed by: FAMILY MEDICINE

## 2024-06-14 PROCEDURE — 84443 ASSAY THYROID STIM HORMONE: CPT | Performed by: FAMILY MEDICINE

## 2024-06-14 PROCEDURE — 86364 TISS TRNSGLTMNASE EA IG CLAS: CPT | Performed by: FAMILY MEDICINE

## 2024-06-14 PROCEDURE — 99417 PROLNG OP E/M EACH 15 MIN: CPT | Performed by: FAMILY MEDICINE

## 2024-06-14 PROCEDURE — 85025 COMPLETE CBC W/AUTO DIFF WBC: CPT | Performed by: FAMILY MEDICINE

## 2024-06-14 PROCEDURE — 80053 COMPREHEN METABOLIC PANEL: CPT | Performed by: FAMILY MEDICINE

## 2024-06-14 PROCEDURE — 83550 IRON BINDING TEST: CPT | Performed by: FAMILY MEDICINE

## 2024-06-14 PROCEDURE — 82607 VITAMIN B-12: CPT | Performed by: FAMILY MEDICINE

## 2024-06-14 RX ORDER — CLOBETASOL PROPIONATE 0.5 MG/G
OINTMENT TOPICAL DAILY PRN
COMMUNITY

## 2024-06-14 ASSESSMENT — PAIN SCALES - GENERAL: PAINLEVEL: MODERATE PAIN (5)

## 2024-06-14 NOTE — PROGRESS NOTES
"The below note was dictated using voice recognition. Although reviewed after completion, some word and grammatical error may remain.    Assessment & Plan     Chronic constipation  Notes a history of chronic constipation his whole life. He reports hard stools and difficulty evacuating bowels. He has a sense of feeling of incomplete evacuation. He has no rectal symptoms currently.  He reports no use of enemas.  He is hesitant of getting dependent on laxatives.  He is not on any fiber supplement. He tries to drink adequate water but may not be drinking enough. He feels he eats a high-fiber diet. It is unclear if prior polio has affected his pelvic muscles.  He has never done pelvic therapy.  He has never seen gastroenterology or colorectal for this concern.  He only discussed his symptoms briefly with the GI doing his scope after his first colonoscopy.  He recalls his parents had digestive issues that they never talked about but they may have also been alcoholics.  His sisters also have digestive issues of unclear etiology.  Reported a CT abdomen done 15 years ago for sharp abdominal pain of unclear etiology was unremarkable  Colonoscopy done in May 2024 at [facility] showed mild proctitis felt to be due to related to the bowel prep and 11 polyps removed several of them precancerous  ( tubular adenoma, serrated adenoma) and advised recheck in 1 year.  Has had 2 prior colonoscopies more than 15 years ago that did not suggest etiology for constipation.  Does have diverticulosis  likely a result of chronic constipation and encouraged a high-fiber diet  Currently in no pain & exam suggests no acute surgical abdomen. Advised increasing water intake to 6 to 8 glasses a day. Increase fiber in diet and stay active. Try MiraLAX 1 tablespoon in 8 ounces liquid daily may titrate up and down to achieve a bowel movement daily that comes out like a \"snake in the lake\" or out like toothpaste.  May have some pelvic floor " dysfunction especially with history of polio that may be affecting evacuation of bowels and advised pelvic therapy and referral placed. May use a squatty potty stool so the knees are higher than level of the pelvis to get better anatomical alignment of bowel to defecate.  Will do some lab work today and make further recommendations. Will check for celiac though this seems less likely.  May do a trial of avoiding all dairy for 2 weeks see if it makes a difference and if it does not, then avoid gluten products for 2 weeks and see if it makes a difference. Could also consider low FODMAPs diet if needed in the future.  Given duration of symptoms I did go ahead and put a referral in for a GI consult to further evaluate and treat. Could consider a colorectal specialist as well in the future.    History of trouble swallowing especially big/dry pills but no trouble swallowing different textures of food or liquids & no choking with liquids.  Reports no heartburn or reflux.  May be related to aging esophagus.  But given other symptoms of increased muscle weakness in the last 6 months and muscle cramps in background of postpolio syndrome referred to the neurologist to further evaluate and treat.    Sharp muscle cramps specific to one area that come and go in the right anterior abdomen sometimes on the left mostly with certain postures like bending over sound musculoskeletal in nature. 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  He is not sure if he knows he could change the ergonomics of his performance but encouraged to discuss with physical therapy when he sees them. Unclear etiology/diagnosis with uncertain prognosis requiring further workup below. Will see what lab work also shows. So far has a normal CBC.    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  There is no tremor.  No double vision.  No falls.  No incontinence of bladder or bowels.  Vision is getting worse but does have early cataracts. Will check some labs & Refer to neurology.     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 The rest of his left foot had been compensating for this all along and now that compensation has become painful on the outer lateral edge of his left foot. He has been going to the gym to use the machine to do dorsi and plantarflexion of the foot to strengthen his left calf muscle.  He feels it is getting progressively weaker and would like to try save it.  Refer to neurology to further evaluate and treat.    Lateral edge left foot pain on the side where had reconstructive Achilles tendon repair at age 11 for polio related leg weakness involving a graft from the left big toe with residual big toe joint fusion.  This has affected mechanics of weightbearing on that side many years that may be now showing up.  There is no tingling or numbness or skin breakdown rash or swelling.  It is affecting his ability to exercise which is essential for him for his digestion as well as his physical and mental wellbeing.  He used to walk a lot and now that has decreased due to foot pain. Did have x-rays at [facility] orth[facility] reported no arthritis or fractures.  May have some plantar fasciitis or tendinopathy.  Is to start wearing orthotics given by them and see what that does. He also plans to see podiatry downstairs to see what they say.     Has also been having right foot pain mostly along the outer edge. He has had a  history of a prior fifth toe fracture > 35 years ago. He did go see [facility] orth[facility] x-rays done showed the old fracture but no new fracture. Corn was pared down.  Feels pain under the pad of foot which makes it difficult to walk.  He was referred for orthotics and is planning to get that for his feet.  He is also planning to see the podiatrist downstairs for more help . He usually wears flexible tennis shoes as feels stiff soled shoes makes it hurt more. He does tend to walk barefoot in the house.  May try rolling feet on a frozen bottle of water 10 minutes daily to see if that helps. In case plantar fasciitis contributing to symptoms recommend avoid walking barefoot and use stiff soled shoes if possible.    The callus on the bottom of the fifth toe ball of foot area likely due to poor mechanics and weightbearing and orthotics may relieve that pressure and might help with the pain.    When he saw [facility] orthopedics for his feet he mentioned his right knee was bothering him they x-rayed it and he ended up seeing a different orthopedist at [facility] regarding the right knee several weeks later.  He was told that he had loss of cartilage at the patella and was assessed to have possible arthritis.  He has no locking but it may feel like buckling if he is not careful.  There is no swelling or redness or warmth.  He was referred and has been doing physical therapy at [facility] to strengthen the quads of the right knee. Continue care with [facility] orthopedics and physical therapy regarding this.    History of enlarged prostate noted on a CT abdomen done many years ago for sharp pain that resolved of unknown etiology.  He reports no dysuria or hematuria no frequency urgency or hesitancy  or incontinence but does have nocturia once to twice at night that is not bothersome, likely related to the prostate  Also reports progressive difficulty retracting the foreskin over many years.  Reports no zipper injury or trauma or tight  under clothing, reports no rash or swelling or ballooning of the foreskin or recurrent UTIs or emptying the bladder.  Referred to the urologist to further evaluate and treat.    History of prediabetes.  Hemoglobin A1c at physical in September last year was 6.2 will recheck today to make sure it has not progressed.  Encouraged low carbohydrate intake smaller portions of wheat pasta gluten rice potato and more Mediterranean style diet avoid eating 3 hours before bedtime and work on some weight loss to help prevent diabetes. Labs came back showing Hemoglobin A1c improved to 6.1  & encouraged to lower carbohydrate intake, and recheck in 6 months ideally    Bilateral tinnitus is new.  No known hearing loss.  Most common cause of tinnitus is likely due to some sensorineural hearing loss.  It can also be due to other causes like loud sound exposure, trauma, tumor etc.  Currently examination does not suggest any concerning findings but given age and symptoms referred to ENT and audiology to further evaluate and treat.    History of eczema ,previously had red itchy patches on hip, leg, finger, and back, triamcinolone was not very helpful then seen by [facility] and given clobetasol 0.05% oint with resolution of symptoms and now only using sparingly as needed mostly to mid back . Has no current rash. May continue to use rice grain amount of clobetasol sparingly to red itchy areas as it is a potent steroid and not to apply for more than 2 weeks in a row to prevent tacky phylaxis, skin thinning and poor healing etcetera.    Former smoker: May have started in his 20's smoked socially less than 1 pack a week off-and-on many years without. thinks may have quit completely by 1980, quit > 40 yrs ago.  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      I was thinking of checking his vitamin D but is not covered by insurance.Recommend might just be easier to take a supplement of vitamin D 2000 units daily given we do not get enough sunshine here & that will help mental and physical health.  Follow-up with dentist for root canal as planned.  Continue care with a routine primary care provider and follow-up with me as needed  - Physical Therapy  Referral; Future  - Adult GI  Referral - Consult Only; Future  - Comprehensive metabolic panel; Future  - TSH with free T 4 reflex; Future  - Tissue transglutaminase patrice IgA and IgG; Future  - Comprehensive metabolic panel  - TSH with free T 4 reflex  - Tissue transglutaminase patrice IgA and IgG    Diverticular disease of colon  Continue with a high-fiber diet    History of adenomatous polyp of colon  [facility] cscope 5/2024: showed polyps removed mostly tubular adenoma & serrated adenoma, & advised recheck in 1 year     Dysphagia, unspecified type  History of trouble swallowing especially big/dry pills but no trouble swallowing different textures of food or liquids & no choking with liquids.  Reports no heartburn or reflux.  May be related to aging esophagus.  But given other symptoms of increased muscle weakness in the last 6 months and muscle cramps in background of postpolio syndrome referred to the neurologist to further evaluate and treat.  - Adult GI  Referral - Consult Only; Future  - TSH with free T 4 reflex; Future  - Vitamin B12; Future  - Adult Neurology  Referral; Future  - TSH with free T 4 reflex  - Vitamin B12    Muscle cramps  Sharp muscle cramps specific to one area that come and go in the right anterior abdomen sometimes on the left mostly with certain postures like bending over sound musculoskeletal in nature. 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  He is not sure if he knows he could change the ergonomics of his performance but encouraged to discuss with physical therapy when he sees them. Unclear etiology/diagnosis with uncertain prognosis requiring further workup below. Will see what lab work also shows. So far has a normal CBC.  - CBC with Platelets & Differential; Future  - Comprehensive metabolic panel; Future  - TSH with free T 4 reflex; Future  - Magnesium; Future  - Phosphorus; Future  - Vitamin B12; Future  - Ferritin; Future  - Iron and iron binding capacity; Future  - Adult Neurology  Referral; Future  - CBC with Platelets & Differential  - Comprehensive metabolic panel  - TSH with free T 4 reflex  - Magnesium  - Phosphorus  - Vitamin B12  - Ferritin  - Iron and iron binding capacity    Muscle weakness (generalized)  History of prior polio & postpolio syndrome with chronic left leg weakness but feels has generalized muscle weakness that has worsened in the last 6 months.  There is no tremor.  No double vision.  No falls.  No incontinence of bladder or bowels.  Vision is getting worse but does have early cataracts. Will check some labs & Refer to neurology.   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 The rest of his  left foot had been compensating for this all along and now that compensation has become painful on the outer lateral edge of his left foot. He has been going to the gym to use the machine to do dorsi and plantarflexion of the foot to strengthen his left calf muscle.  He feels it is getting progressively weaker and would like to try save it.  Refer to neurology to further evaluate and treat.  - Adult Neurology  Referral; Future    Left foot pain  Lateral edge left foot pain on the side where had reconstructive Achilles tendon repair at age 11 for polio related leg weakness involving a graft from the left big toe with residual big toe joint fusion.  This has affected mechanics of weightbearing on that side many years that may be now showing up.  There is no tingling or numbness or skin breakdown rash or swelling.  It is affecting his ability to exercise which is essential for him for his digestion as well as his physical and mental wellbeing.  He used to walk a lot and now that has decreased due to foot pain. Did have x-rays at [facility] orthopedics reported no arthritis or fractures.  May have some plantar fasciitis or tendinopathy.  Is to start wearing orthotics given by them and see what that does. He also plans to see podiatry downstairs to see what they say.     Right foot pain  Has also been having right foot pain mostly along the outer edge. He has had a history of a prior fifth toe fracture > 35 years ago. He did go see [facility] orthopedics x-rays done showed the old fracture but no new fracture. Corn was pared down.  Feels pain under the pad of foot which makes it difficult to walk.  He was referred for orthotics and is planning to get that for his feet.  He is also planning to see the podiatrist downstairs for more help . He usually wears flexible tennis shoes as feels stiff soled shoes makes it hurt more. He does tend to walk barefoot in the house.  May try rolling feet on a frozen bottle of water 10 minutes  daily to see if that helps. In case plantar fasciitis contributing to symptoms recommend avoid walking barefoot and use stiff soled shoes if possible.    Callus of foot  The callus on the bottom of the fifth toe ball of foot area likely due to poor mechanics and weightbearing and orthotics may relieve that pressure and might help with the pain.    Right knee pain, unspecified chronicity  When he saw [facility] orthopedics for his feet he mentioned his right knee was bothering him they x-rayed it and he ended up seeing a different orthopedist at [facility] regarding the right knee several weeks later.  He was told that he had loss of cartilage at the patella and was assessed to have possible arthritis.  He has no locking but it may feel like buckling if he is not careful.  There is no swelling or redness or warmth.  He was referred and has been doing physical therapy at [facility] to strengthen the quads of the right knee. Continue care with [facility] orthopedics and physical therapy regarding this.    Enlarged prostate  Nocturia  Foreskin adhesions  History of enlarged prostate noted on a CT abdomen done many years ago for sharp pain that resolved of unknown etiology.  He reports no dysuria or hematuria no frequency urgency or hesitancy  or incontinence but does have nocturia once to twice at night that is not bothersome, likely related to the prostate  Also reports progressive difficulty retracting the foreskin over many years.  Reports no zipper injury or trauma or tight under clothing, reports no rash or swelling or ballooning of the foreskin or recurrent UTIs or emptying the bladder.  Referred to the urologist to further evaluate and treat.  - Adult Urology  Referral; Future    Prediabetes  History of prediabetes.  Hemoglobin A1c at physical in September last year was 6.2 will recheck today to make sure it has not progressed.  Encouraged low carbohydrate intake smaller portions of wheat pasta gluten rice potato  and more Mediterranean style diet avoid eating 3 hours before bedtime and work on some weight loss to help prevent diabetes. Labs came back showing Hemoglobin A1c improved to 6.1  & encouraged to lower carbohydrate intake, and recheck in 6 months ideally  - Comprehensive metabolic panel; Future  - Hemoglobin A1c; Future  - Comprehensive metabolic panel  - Hemoglobin A1c    Tinnitus, bilateral  Bilateral tinnitus is new.  No known hearing loss.  Most common cause of tinnitus is likely due to some sensorineural hearing loss.  It can also be due to other causes like loud sound exposure, trauma, tumor etc.  Currently examination does not suggest any concerning findings but given age and symptoms referred to ENT and audiology to further evaluate and treat.  - Adult ENT  Referral; Future  - Adult Audiology  Referral; Future    History of eczema  History of eczema ,previously had red itchy patches on hip, leg, finger, and back, triamcinolone was not very helpful then seen by [facility] and given clobetasol 0.05% oint with resolution of symptoms and now only using sparingly as needed mostly to mid back . Has no current rash. May continue to use rice grain amount of clobetasol sparingly to red itchy areas as it is a potent steroid and not to apply for more than 2 weeks in a row to prevent tacky phylaxis, skin thinning and poor healing etcetera.    Former smoker  Former smoker: May have started in his 20's smoked socially less than 1 pack a week off-and-on many years without. thinks may have quit completely by 1980, quit > 40 yrs ago.  Did play pool in a bar that may have exposed him to passive smoke for many years after that, but currently does not meet criteria for CT lung cancer screening    Abnormal finding of blood chemistry, unspecified  I was thinking of checking his vitamin D but is not covered by insurance.Recommend might just be easier to take a supplement of vitamin D 2000 units daily given  "we do not get enough sunshine here & that will help mental and physical health.  - Ferritin; Future  - Iron and iron binding capacity; Future  - Ferritin  - Iron and iron binding capacity    Vaccines reviewed and up to date other than shingles vaccine and encouraged to get this at his pharmacy.    Follow-up with dentist for root canal as planned.  Continue care with a routine primary care provider and follow-up with me as needed  BMI  Estimated body mass index is 27.04 kg/m  as calculated from the following:    Height as of this encounter: 1.74 m (5' 8.5\").    Weight as of this encounter: 81.9 kg (180 lb 8 oz).   Weight management plan: Discussed healthy diet and exercise guidelines Patient was referred to their PCP to discuss a diet and exercise plan.  Work on weight loss  Regular exercise  See Patient Instructions      Moshe Jesus is a 74 year old, presenting for the following health issues:  Pain (Right knee, legs) and Abdominal Pain (cramps)        6/14/2024     2:52 PM   Additional Questions   Roomed by Natalie KIM   Accompanied by self     History of Present Illness       Reason for visit:  Weakness and pain in legs and especially feet. Long history of difficulty with daily bowel movement . Penis foreskin compromised. Hearing (tinitus)  Symptom onset:  More than a month  Symptom intensity:  Moderate  Symptom progression:  Staying the same  Had these symptoms before:  Yes  Has tried/received treatment for these symptoms:  No  What makes it worse:  Unsure. Above answer re treatment should be both yes and no  What makes it better:  Somewhat about exercise.    He eats 2-3 servings of fruits and vegetables daily.He consumes 2 sweetened beverage(s) daily.He exercises with enough effort to increase his heart rate 20 to 29 minutes per day.  He exercises with enough effort to increase his heart rate 7 days per week. He is missing 4 dose(s) of medications per week.  He is not taking prescribed medications " regularly due to other.     BACKGROUND  74-year-old gentleman retired  at the [employer], with history of chronic constipation, diverticulosis, history of tubular adenomatous polyps and serrated adenomatous polyps removed from colon last colonoscopy 5/2024 has had a couple prior, has had a normal CT abdomen in the past, mild proctitis noted thought secondary to bowel prep in 2024, advise recheck in 1 year due to number of polyps, history of dysphagia need to drive pills not to different textured foods solids or liquids, sharp very localized anterior abdominal muscle cramps occurring off-and-on many years with increased frequency as got older mostly with postural changes like bending, plays the cello which may affect this, history of generalized muscle weakness, history of post poliomyelitis syndrome and residual left leg weakness prior Achilles reconstruction using left big toe tendon at age 11 when also had a big toe fusion.  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 doing physical therapy to strengthen quads, history of callus right ball of fifth toe, history of enlarged prostate reported on a prior CT abdomen, has nocturia once to twice at night not bothersome, history of difficulty retracting foreskin that has gotten progressive over many years reporting no history of trauma rash swelling ballooning recurrent UTIs or emptying bladder, history of prediabetes hemoglobin A1c noted 6.2 in 2023, history of eczema previously had red itchy patches on hip leg finger back seen by [facility] given clobetasol at resolution of symptoms and uses the ointment sparingly as needed,  former smoker may have started in his 20's smokes socially less than a pack a week off and on many years many years without smoking feels quit completely by 1980.  No known drug allergies, [place]  negative,  [place] in [place] in [place] before moving out of state lived in [facility] and then 8 years in [place] before moving back to [place] in 2022.  Lives with his longtime partner Ladi and her dog.  Moved back to [place] after 40 years in 2022.  Establish with a [facility] provider in September 2023 and a Medicare wellness visit.  Referred to physical therapy for foot pain as well as for left tennis elbow at the time.  Due to prior smoking history ultrasound AAA ordered but did not get done.  LDL was 111, PSA CBC CMP hep C screen was normal.  Hemoglobin A1c was 6.2.  Colonoscopy done 5/1/2024 showed proctitis, 9 polyps removed 1 was normal rest with a tubular adenoma or serrated adenoma also noted asymptomatic diverticulosis.  Was advised to recheck in 1 year given number of polyps.    CURRENTLY  Here for several things New to this provider and clinic.  Limited records in [place] last seen for establish care in September 2023 at [place] [facility].  Colonoscopy done in May 2024.  No records available from [place] [facility] where he was living 40 years prior to returning to [place].  Notes has not had very good experience with healthcare providers in the past where perceived providers being dismissive.  Reports some acute and some chronic issues and several things have changed in the last 6 months to 1 year which has been concerning for him.    Notes a history of chronic constipation.  His entire life every single day it has been difficult for him to have a bowel movement.  He has never had any luck with providers telling him what to do regarding it.  He feels as if he almost has a nervous reaction fighting his gut no matter what he eats or does.  He never has diarrhea.  He reports hard  stools and difficulty evacuating bowels.  He has a sense of feeling of incomplete evacuation.  If he does not have a bowel movement every day he feels it is more difficult to move around gets a backache.  He has had no blood in the stools or black stools or unintentional weight loss.  He reports a CT abdomen done about 15 years prior that did not show anything.  He had a colonoscopy done in the third 1 in his lifetime recently in May which showed asymptomatic diverticulosis adenomatous polyps requiring recheck in 1 years they removed about 9 and also noted mild proctitis of the rectum which has never happened to him before but was told it was due to bowel prep.  He has no rectal symptoms currently.  He reports no use of enemas.  He is hesitant of getting dependent on laxatives.  He is not on any fiber supplement.  A primary care provider did tell him 15 years ago to eat 10 fruits a day which was helpful.  He tries to drink adequate water but may not be drinking enough.  He tries to have a week mixture of fruit juice with 90% water to help drink more fluids.  He recalls when he had acute pain 15 years ago tests including a CT abdomen did not explain findings other than showing an incidental enlarged prostate. He feels he eats a high-fiber diet fruit in the day high-fiber toast every evening a meal has a salad.  He reports no abdominal pain no heartburn or reflux or nausea vomiting or diarrhea.  Stools are not difficult to pass there is no blood in the stool or black stools.  He is unsure of his prior polio has affected his pelvic muscles.  He has never done pelvic therapy.  Not sure if he seen GI in a consultation regarding this or colorectal.  He only discussed his symptoms briefly with GI during his scope 15 years ago after his first colonoscopy.  He recalls his parents had digestive issues that they never talked about but they may have also been alcoholics.  His sisters also have digestive issues of unclear  etiology.    Notes trouble swallowing recently only if pills are very dry or big and not to any different textured foods or solids or liquids.  Reports no choking no heartburn or reflux no unintentional weight loss blood in the stools or black stools. Reports no trouble hearing, smelling, tasting, no cough, no chest pain, trouble breathing or palpitations.  Has had several prior root canals and has 1 next week.    Has noted sharp very localized anterior abdominal wall muscle cramps in 2 different places in the abdomen mostly on the right anterior abdomen lateral to the umbilicus and sometimes in the left lower abdomen.  Seems to occur only if moving or bending wrong.  Has been going on many years usually once or twice a year the most or once every few years but recently has occurred more frequently few times a year, last occurred several months ago.  There is no associated fever chills headache or dizziness facial pain earache sore throat runny nose postnasal drip.  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  He is not sure if he knows he could change the ergonomics of his performance but encouraged to discuss with physical therapy when he sees them.    Feels generalized muscle weakness that has gotten worse over the last 6 months.  There is no tremor.  No double vision.  No falls.  No incontinence of bladder or bowels.  Vision is getting worse does have early cataracts.    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 The rest of his left foot had been compensating for this all along  and now that compensation has become painful on the outer lateral edge of his left foot.      Left foot pain outer edge, Pain can be worse in the morning but does not resolve with time. The outside of his little toe starts hurting.  There is no tingling or numbness or skin breakdown rash or swelling.  It is affecting his ability to exercise which is essential for him for his digestion as well as his physical and mental wellbeing.  He used to walk a lot and now that has decreased due to foot pain. He has been going to the gym to use the machine to push his left foot on to help with strengthening his calf muscles. He does some foot exercises at the gym to strengthen the calf which has always been weaker than the right side but he feels it is atrophying more and is trying to save it.    He is also had right foot pain lateral edge and ball of fifth toe on the right where has a callus.  He has had a history of a prior fifth toe fracture > 35 years ago. He did go see [facility] orthoped[facility] x-rays done showed the old fracture but no new fracture. Corn was pared down.  Feels pain under the pad of foot which makes it difficult to walk.  He was referred for orthotics and is planning to get that for his feet.  He is also planning to see the podiatrist downstairs for more help He usually wears flexible tennis shoes stiff soled shoes make it hurt more.  He does tend to walk barefoot in the house.      When he saw [facility] orthopedics for his feet he mentioned his right knee was bothering him they x-rayed it and he ended up seeing a different orthopedist at [facility] regarding the right knee several weeks later.  He was told that he had loss of cartilage at the patella and was assessed to have possible arthritis.  He has no locking but it may feel like buckling if he is not careful.  There is no swelling or redness or warmth.  He was referred and has been doing physical therapy at [facility] to strengthen the quads of the right knee has  "no leg swelling.    Reports an enlarged prostate was noted on CT abdomen done 15 years ago for sharp pain that resolved of unknown etiology.  He reports no dysuria or hematuria no frequency urgency or hesitancy  or incontinence but does have nocturia once to twice at night that is not bothersome.    Also reports progressive difficulty retracting the foreskin over many years.  Reports note zipper injury or trauma or tight on the clothing, reports no rash or swelling or ballooning of the foreskin or recurrent UTIs or emptying the bladder.  He would be open to urology referral    Striae prediabetes hemoglobin A1c 6.2 in September 2023 agreeable to rechecking today.    In the last several months he has noted bilateral tinnitus very high-pitched but feels his hearing okay.  Reports no vertigo.     History of eczema previously had red itchy patches on hip leg finger and back triamcinolone was not very helpful seen by [facility] and given clobetasol with resolution of symptoms and now only using sparingly as needed mostly to mid back though has no current rash.    Former smoker: May have started in his 20's smoked socially less than 1 pack a week off-and-on many years without. thinks may have quit completely by 1980, quit > 40 yrs ago.  Did play pool in a bar that may have exposed him to passive smoke for many years after that, but currently does not meet criteria for CT lung cancer screening    Vaccines reviewed and up to date other than shingles vaccine and encouraged to get this at his pharmacy.        Review of Systems  Constitutional, HEENT, cardiovascular, pulmonary, GI, , musculoskeletal, neuro, skin, endocrine and psych systems are negative, except as otherwise noted.      Objective    /77 (BP Location: Right arm, Patient Position: Sitting, Cuff Size: Adult Regular)   Pulse 75   Temp 97.8  F (36.6  C) (Temporal)   Resp 18   Ht 1.74 m (5' 8.5\")   Wt 81.9 kg (180 lb 8 oz)   SpO2 96%   BMI 27.04 " kg/m    Body mass index is 27.04 kg/m .  Physical Exam   GENERAL: alert, no distress, and over weight  EYES: Eyes grossly normal to inspection, PERRL and conjunctivae and sclerae normal, glasses  HENT: ear canals and TM's normal, nose and mouth without ulcers or lesions  NECK: no adenopathy, no asymmetry, masses, or scars  RESP: lungs clear to auscultation - no rales, rhonchi or wheezes  CV: regular rate and rhythm, normal S1 S2, no S3 or S4, no murmur, click or rub, no peripheral edema  ABDOMEN: soft, nontender, no hepatosplenomegaly, no masses and bowel sounds normal  MS: Left lower extremity is atrophic compared to the right.  Gait is normal.  No swelling or redness or warmth noted.  Full range of motion right knee.  No varicosities or edema and dorsalis pedis present bilaterally.  Skin is warm and dry.  Right foot may be a hammertoe fifth toe and a callus at the ball of the fifth toe 1 cm not infected.  The left foot showed surgical scar at Klees area and dorsum of left big toe onto hindfoot.    SKIN: no suspicious lesions or rashes, no eczema currently noted.  Has multiple pigmented nevi and freckles sun damage spots etc.  NEURO: Normal strength and tone, sensory exam grossly normal, and mentation intact  PSYCH: mentation appears normal, affect normal/bright    Results for orders placed or performed in visit on 06/14/24   Hemoglobin A1c     Status: Abnormal   Result Value Ref Range    Hemoglobin A1C 6.1 (H) 0.0 - 5.6 %   CBC with platelets and differential     Status: None   Result Value Ref Range    WBC Count 6.6 4.0 - 11.0 10e3/uL    RBC Count 4.74 4.40 - 5.90 10e6/uL    Hemoglobin 14.1 13.3 - 17.7 g/dL    Hematocrit 43.2 40.0 - 53.0 %    MCV 91 78 - 100 fL    MCH 29.7 26.5 - 33.0 pg    MCHC 32.6 31.5 - 36.5 g/dL    RDW 13.5 10.0 - 15.0 %    Platelet Count 256 150 - 450 10e3/uL    % Neutrophils 58 %    % Lymphocytes 25 %    % Monocytes 11 %    % Eosinophils 6 %    % Basophils 1 %    % Immature Granulocytes 0  %    Absolute Neutrophils 3.8 1.6 - 8.3 10e3/uL    Absolute Lymphocytes 1.7 0.8 - 5.3 10e3/uL    Absolute Monocytes 0.7 0.0 - 1.3 10e3/uL    Absolute Eosinophils 0.4 0.0 - 0.7 10e3/uL    Absolute Basophils 0.1 0.0 - 0.2 10e3/uL    Absolute Immature Granulocytes 0.0 <=0.4 10e3/uL   CBC with Platelets & Differential     Status: None    Narrative    The following orders were created for panel order CBC with Platelets & Differential.  Procedure                               Abnormality         Status                     ---------                               -----------         ------                     CBC with platelets and d...[[id number]]                      Final result                 Please view results for these tests on the individual orders.     Results for orders placed or performed in visit on 06/14/24 (from the past 24 hour(s))   CBC with Platelets & Differential    Narrative    The following orders were created for panel order CBC with Platelets & Differential.  Procedure                               Abnormality         Status                     ---------                               -----------         ------                     CBC with platelets and d...[[id number]]                      Final result                 Please view results for these tests on the individual orders.   Hemoglobin A1c   Result Value Ref Range    Hemoglobin A1C 6.1 (H) 0.0 - 5.6 %   CBC with platelets and differential   Result Value Ref Range    WBC Count 6.6 4.0 - 11.0 10e3/uL    RBC Count 4.74 4.40 - 5.90 10e6/uL    Hemoglobin 14.1 13.3 - 17.7 g/dL    Hematocrit 43.2 40.0 - 53.0 %    MCV 91 78 - 100 fL    MCH 29.7 26.5 - 33.0 pg    MCHC 32.6 31.5 - 36.5 g/dL    RDW 13.5 10.0 - 15.0 %    Platelet Count 256 150 - 450 10e3/uL    % Neutrophils 58 %    % Lymphocytes 25 %    % Monocytes 11 %    % Eosinophils 6 %    % Basophils 1 %    % Immature Granulocytes 0 %    Absolute Neutrophils 3.8 1.6 - 8.3 10e3/uL    Absolute Lymphocytes  1.7 0.8 - 5.3 10e3/uL    Absolute Monocytes 0.7 0.0 - 1.3 10e3/uL    Absolute Eosinophils 0.4 0.0 - 0.7 10e3/uL    Absolute Basophils 0.1 0.0 - 0.2 10e3/uL    Absolute Immature Granulocytes 0.0 <=0.4 10e3/uL           Signed Electronically by: Argentina Brown MD

## 2024-06-14 NOTE — PATIENT INSTRUCTIONS
"Seen today for several things.  New to this provider & clinic.    History of chronic constipation his whole life not sure if polio affected this as well.  Family history of digestive issues undiagnosed.  Bowel movements are hard and if does not go daily feels affects back and activity.  Has had no unintentional weight loss blood in the stools or black stools.  Reported a CT abdomen done 15 years ago for sharp abdominal pain of unclear etiology was unremarkable  Colonoscopy done in May 2024 at [facility] showed mild proctitis felt to be due to related to the bowel prep and 11 polyps removed several of them precancerous  ( tubular adenoma, serrated adenoma) and advised recheck in 1 year.  Has had 2 prior colonoscopies more than 15 years ago that did not suggest etiology for constipation.  Does have diverticulosis  likely a result of chronic constipation and encouraged a high-fiber diet  Currently in no pain & exam suggests no acute surgical abdomen.  Advised increasing water intake to 6 to 8 glasses a day.  Increase fiber in diet and stay active.  Try MiraLAX 1 tablespoon in 8 ounces liquid daily may titrate up and down to achieve a bowel movement daily that comes out like a \"snake in the lake\"  or out like toothpaste.  May have some pelvic floor dysfunction specially with history of polio that may be affecting evacuation of bowels and advised pelvic therapy and referral placed.  May use a squatty potty stool so the knees are higher than level of the pelvis to get better anatomical alignment of bowel to defecate.  Will do some lab work today and make further recommendations.  Will check for celiac though this seems less likely.  May do a trial of avoiding all dairy for 2 weeks see if it makes a difference and if it does not then avoid gluten products for 2 weeks and see if it makes a difference.  Could also consider low FODMAPs diet if needed in the future.  Given duration of symptoms I did go ahead and put a " referral in for a GI consult to further evaluate and treat.  Could consider a colorectal specialist as well in the future.    History of trouble swallowing especially big/dry pills but no trouble swallowing different textures of food or liquids & no choking with liquids.  Reports no heartburn or reflux.  May be related to aging esophagus.  But given other symptoms of increased muscle weakness in the last 6 months and muscle cramps in background of postpolio syndrome referred to the neurologist to further evaluate and treat.    Sharp muscle cramps specific to one area that come and go in the right anterior abdomen sometimes on the left mostly with certain postures like bending over sound musculoskeletal in nature.  May be related to posture and ergonomics of playing the cello.  May discuss with physical therapy when you see them we will see what lab work also shows.  Cramps can commonly occur when dehydrated so staying well-hydrated & being active may also be beneficial.    History of prior polio & postpolio syndrome with chronic left leg weakness but feels has generalized muscle weakness that has worsened in the last 6 months.      Lateral edge left foot pain on the side where had reconstructive Achilles tendon repair at age 11 for polio related leg weakness involving a graft from the left big toe with residual big toe joint fusion.  This has affected mechanics of weightbearing on that side many years that may be now showing up.  Did have x-rays at [facility] orthopedics reported no arthritis or fractures.  May have some plantar fasciitis or tendinopathy.  Is to start wearing orthotics given by them and see what that does. He also plans to see podiatry downstairs to see what they say.    Has also been having right foot pain mostly along the outer edge. Has had a prior history of fifth toe fracture.  X-rays done at [facility] of feet showed no new fracture. No records available. Is to try orthotics and go from there. & see  podiatry in clinic below for this as well.   May try rolling feet on a frozen bottle of water 10 minutes daily to see if that helps.  In case plantar fasciitis contributing to symptoms recommend avoid walking barefoot and use stiff soled shoes if  possible.    The callus on the bottom of the fifth toe ball of foot area likely due to poor mechanics and weightbearing and orthotics may relieve that pressure and might help with the pain.    Has been having right patellar knee pain x-rays done at [facility] orthopedics suggested some patellar arthritis.  Has been doing physical therapy to strengthen the quads and help with symptoms.  Continue care with [facility] orthopedics and physical therapy regarding this.    History of enlarged prostate noted on a CT abdomen done many years ago does note some nocturia getting up once to twice at night to urinate likely related to the prostate.  No other bothersome symptoms.  Does also report unable to retract foreskin that has progressively worsened over time there is no ballooning or history of urine infections or rash.  Refer to the urologist to further evaluate and treat.    History of prediabetes.  Hemoglobin A1c at physical in September last year was 6.2 will recheck today to make sure it has not progressed.  Encouraged low carbohydrate intake smaller portions of wheat pasta gluten rice potato and more Mediterranean style diet avoid eating 3 hours before bedtime and work on some weight loss to help prevent diabetes.    Bilateral tinnitus is new.  No known hearing loss.  Most common cause of tinnitus is likely due to some sensorineural hearing loss.  It can also be due to other causes like loud sound exposure, trauma, tumor etc.  Currently examination does not suggest any concerning findings but given age and symptoms refer to ENT and audiology to further evaluate and treat.    History of eczema seen by dermatology consultants use clobetasol with relief eczema previously affecting back hip  legs fingers now resolved and has rare itching mostly mid back relieved by clobetasol using sparingly at most once a week at most.  Continue to use rice grain amount sparingly to red itchy areas as it is a potent steroid and not to apply for more than 2 weeks in a row.    I was thinking of checking your vitamin D but is not covered by insurance.  Recommend might just be easier to take a supplement of vitamin D 2000 units daily given we do not get enough sunshine here & that will help mental and physical health.    Follow-up with dentist for root canal as planned.  Former smoker based on history and when quit does not meet criteria for CT lung cancer screening  Consider shingles vaccine at your pharmacy  Recommendations will be made once diagnostics reviewed, follow-up as needed.    The above note was dictated using voice recognition. Although reviewed after completion, some word and grammatical error may remain .

## 2024-06-14 NOTE — RESULT ENCOUNTER NOTE
Hello -    Here are my comments about your recent results:  -Normal red blood cell (hgb) levels, normal white blood cell count and normal platelet levels.  Hemoglobin A1c currently improved to 6.1 still in the prediabetic range.  Encouraged to lower carbohydrate intake, and rechecking in 6 months ideally  For additional lab test information, labtestsonline.org is an excellent reference..    Please let us know if you have any questions or concerns.     Regards,  Argentina Brown MD

## 2024-06-15 ENCOUNTER — TELEPHONE (OUTPATIENT)
Dept: FAMILY MEDICINE | Facility: CLINIC | Age: 74
End: 2024-06-15
Payer: COMMERCIAL

## 2024-06-15 DIAGNOSIS — E53.8 VITAMIN B12 DEFICIENCY (NON ANEMIC): Primary | ICD-10-CM

## 2024-06-15 LAB
ALBUMIN SERPL BCG-MCNC: 4.6 G/DL (ref 3.5–5.2)
ALP SERPL-CCNC: 54 U/L (ref 40–150)
ALT SERPL W P-5'-P-CCNC: 15 U/L (ref 0–70)
ANION GAP SERPL CALCULATED.3IONS-SCNC: 9 MMOL/L (ref 7–15)
AST SERPL W P-5'-P-CCNC: 19 U/L (ref 0–45)
BILIRUB SERPL-MCNC: 0.3 MG/DL
BUN SERPL-MCNC: 14.1 MG/DL (ref 8–23)
CALCIUM SERPL-MCNC: 9.6 MG/DL (ref 8.8–10.2)
CHLORIDE SERPL-SCNC: 104 MMOL/L (ref 98–107)
CREAT SERPL-MCNC: 0.85 MG/DL (ref 0.67–1.17)
DEPRECATED HCO3 PLAS-SCNC: 28 MMOL/L (ref 22–29)
EGFRCR SERPLBLD CKD-EPI 2021: >90 ML/MIN/1.73M2
FERRITIN SERPL-MCNC: 110 NG/ML (ref 31–409)
GLUCOSE SERPL-MCNC: 101 MG/DL (ref 70–99)
IRON BINDING CAPACITY (ROCHE): 278 UG/DL (ref 240–430)
IRON SATN MFR SERPL: 27 % (ref 15–46)
IRON SERPL-MCNC: 75 UG/DL (ref 61–157)
MAGNESIUM SERPL-MCNC: 2.3 MG/DL (ref 1.7–2.3)
PHOSPHATE SERPL-MCNC: 3.6 MG/DL (ref 2.5–4.5)
POTASSIUM SERPL-SCNC: 4.6 MMOL/L (ref 3.4–5.3)
PROT SERPL-MCNC: 7.4 G/DL (ref 6.4–8.3)
SODIUM SERPL-SCNC: 141 MMOL/L (ref 135–145)
TSH SERPL DL<=0.005 MIU/L-ACNC: 3.49 UIU/ML (ref 0.3–4.2)
VIT B12 SERPL-MCNC: <150 PG/ML (ref 232–1245)

## 2024-06-15 NOTE — RESULT ENCOUNTER NOTE
Hello -    Here are my comments about your recent results:  -Liver and gallbladder tests are normal (ALT,AST, Alk phos, bilirubin), kidney function is normal (Cr, GFR), sodium is normal, potassium is normal, calcium is normal, glucose is normal.  -TSH (thyroid stimulating hormone) level is normal which indicates normal thyroid function.  -Magnesium is normal.  -Vitamin B12 result is very low.  Start Vitamin B12 1000 mcg injections daily for 1 week then weekly for 4 weeks. Then monthly for three months.  Please make an appointment with our nurse to schedule this injection & can also learn to give it to your self.  Repeat Vitamin B12 levels in 4 months. At which time we could consider transitioning to an oral B 12 option at the time  Please also take folic acid 1 mg daily, you can find this over the counter.  -Ferritin (iron) level is normal.  -Iron level is normal.    For additional lab test information, labtestsonline.org is an excellent reference..    Please let us know if you have any questions or concerns.     Regards,  Argentina Brown MD

## 2024-06-15 NOTE — TELEPHONE ENCOUNTER
Vitamin B12 result is very low. < 150. Start Vitamin B12 1000 mcg injections daily for 1 week then weekly for 4 weeks. Then monthly for three months. Please make an appointment with our nurse to schedule this injection & can also learn to give it to your self. Repeat Vitamin B12 levels in 4 months. At which time we could consider transitioning to an oral B 12 option at the time Please also take folic acid 1 mg daily, you can find this over the counter.

## 2024-06-17 ENCOUNTER — TELEPHONE (OUTPATIENT)
Dept: OTHER | Age: 74
End: 2024-06-17

## 2024-06-17 ENCOUNTER — PATIENT OUTREACH (OUTPATIENT)
Dept: GASTROENTEROLOGY | Facility: CLINIC | Age: 74
End: 2024-06-17
Payer: COMMERCIAL

## 2024-06-17 ENCOUNTER — TELEPHONE (OUTPATIENT)
Dept: FAMILY MEDICINE | Facility: CLINIC | Age: 74
End: 2024-06-17
Payer: COMMERCIAL

## 2024-06-17 DIAGNOSIS — E53.8 VITAMIN B12 DEFICIENCY (NON ANEMIC): Primary | ICD-10-CM

## 2024-06-17 LAB
TTG IGA SER-ACNC: 0.3 U/ML
TTG IGG SER-ACNC: <0.6 U/ML

## 2024-06-17 RX ORDER — CYANOCOBALAMIN 1000 UG/ML
INJECTION, SOLUTION INTRAMUSCULAR; SUBCUTANEOUS
Qty: 14 ML | Refills: 0 | Status: SHIPPED | OUTPATIENT
Start: 2024-06-17

## 2024-06-17 RX ORDER — ISOPROPYL ALCOHOL 700 MG/ML
1 CLOTH TOPICAL PRN
Qty: 40 EACH | Refills: 0 | Status: SHIPPED | OUTPATIENT
Start: 2024-06-17

## 2024-06-17 RX ORDER — SYRINGE-NEEDLE,INSULIN,0.5 ML 27GX1/2"
SYRINGE, EMPTY DISPOSABLE MISCELLANEOUS
Qty: 20 EACH | Refills: 0 | Status: SHIPPED | OUTPATIENT
Start: 2024-06-17

## 2024-06-17 NOTE — TELEPHONE ENCOUNTER
1000 mcg IM daily 1 week, then 1 per week for 4 weeks, then 1 per month for 3 months = total 14 ml , then recheck B 12 level  Sent in as below. Not sure what else to send

## 2024-06-17 NOTE — TELEPHONE ENCOUNTER
[facility] Call Center    Phone Message    May a detailed message be left on voicemail: yes     Reason for Call: Other: Patient being referred for multiple Dx's and writer unsure best provider to schedule Pt with.      Enlarged prostate  Nocturia  Foreskin adhesions     Action Taken: Message routed to:  [facility]    Travel Screening: Not Applicable     Date of Service:

## 2024-06-17 NOTE — RESULT ENCOUNTER NOTE
Hello -    Here are my comments about your recent results:  Negative for celiac    Please let us know if you have any questions or concerns.     Regards,  Argentina Brown MD

## 2024-06-17 NOTE — TELEPHONE ENCOUNTER
Called patient and reviewed message below    He would prefer to have the B12 injections sent to his pharmacy and make an appt with RN for teaching (which is wife will also attend to be able to give him the shots when she can)    Scheduled the RN appt for 7/1 and informed patient to bring his own supply that Dr. Brown will be sending into the pharmacy for the Vit B 12 and injections supplies for teaching.    Dr. Brown-- pended pharmacy to send the B12 and injection supplies    GEOFFREY Olivarez RN  [facility]

## 2024-06-17 NOTE — TELEPHONE ENCOUNTER
Yes even I couldn't find them so thought may be the B 12 came in prefilled syringes but I signed what was pended. Thanks    H Plasty Text: Given the location of the defect, shape of the defect and the proximity to free margins a H-plasty was deemed most appropriate for repair.  Using a sterile surgical marker, the appropriate advancement arms of the H-plasty were drawn incorporating the defect and placing the expected incisions within the relaxed skin tension lines where possible. The area thus outlined was incised deep to adipose tissue with a #15 scalpel blade. The skin margins were undermined to an appropriate distance in all directions utilizing iris scissors.  The opposing advancement arms were then advanced into place in opposite direction and anchored with interrupted buried subcutaneous sutures.

## 2024-06-17 NOTE — TELEPHONE ENCOUNTER
I called the pharmacy as I wasn't seeing regular syringes/needles to be able to order    They said to go ahead and just put insulin syringes/needles and write on the script to dispense correct syringes and needles for Vitamin B12 injections and they will dispense the correct ones.    Pended this as directed by pharmacy as well as alcohol wipes    TONEY OlivarezN DEE  [facility]

## 2024-06-17 NOTE — TELEPHONE ENCOUNTER
----- Message from Argentina Brown MD sent at 6/15/2024  2:28 PM CDT -----  Hello -    Here are my comments about your recent results:  -Liver and gallbladder tests are normal (ALT,AST, Alk phos, bilirubin), kidney function is normal (Cr, GFR), sodium is normal, potassium is normal, calcium is normal, glucose is normal.  -TSH (thyroid stimulating hormone) level is normal which indicates normal thyroid function.  -Magnesium is normal.  -Vitamin B12 result is very low.  Start Vitamin B12 1000 mcg injections daily for 1 week then weekly for 4 weeks. Then monthly for three months.  Please make an appointment with our nurse to schedule this injection & can also learn to give it to your self.  Repeat Vitamin B12 levels in 4 months. At which time we could consider transitioning to an oral B 12 option at the time  Please also take folic acid 1 mg daily, you can find this over the counter.  -Ferritin (iron) level is normal.  -Iron level is normal.    For additional lab test information, labtestsonline.org is an excellent reference..    Please let us know if you have any questions or concerns.      Regards,  Argentina Brown MD

## 2024-06-17 NOTE — TELEPHONE ENCOUNTER
Called pt and let him know these were all sent in    TONEY OlivarezN RN  [facility]

## 2024-06-18 ENCOUNTER — TELEPHONE (OUTPATIENT)
Dept: GASTROENTEROLOGY | Facility: CLINIC | Age: 74
End: 2024-06-18

## 2024-06-18 NOTE — TELEPHONE ENCOUNTER
[facility] Call Center    Phone Message    May a detailed message be left on voicemail: Yes    Reason for Call: Other: Patient is currently scheduled on 08/27/2024, as visit type New GI Urgent. This is outside the expected timeline for this referral. Patient has been added to the waitlist.      Action Taken: Message routed to:  Other: GI REFERRAL TRIAGE POOL     Travel Screening: Not Applicable

## 2024-06-20 ENCOUNTER — TELEPHONE (OUTPATIENT)
Dept: FAMILY MEDICINE | Facility: CLINIC | Age: 74
End: 2024-06-20

## 2024-06-20 ENCOUNTER — ALLIED HEALTH/NURSE VISIT (OUTPATIENT)
Dept: FAMILY MEDICINE | Facility: CLINIC | Age: 74
End: 2024-06-20
Payer: COMMERCIAL

## 2024-06-20 DIAGNOSIS — Z71.89 INJECTION EDUCATION, ENCOUNTER FOR: Primary | ICD-10-CM

## 2024-06-20 PROCEDURE — 99207 PR NO CHARGE NURSE ONLY: CPT

## 2024-06-20 NOTE — TELEPHONE ENCOUNTER
"\"Hello -     Here are my comments about your recent results:  Negative for celiac     Please let us know if you have any questions or concerns.     Regards,  Argentina Brown MD\"  "

## 2024-06-20 NOTE — TELEPHONE ENCOUNTER
Will relay result in person when pt comes in for RN education.  BLANCHE Mathew, BSN, RN (she/her)  [facility] RN

## 2024-06-20 NOTE — PROGRESS NOTES
Patient presents today with spouse, Ladi, for RN education on B12 injection self-administration. Patient has not ever given a self-injection, so RN to review all steps including: hand hygiene, drawing up med from vial, needle safety, site identification, skin/site prep, self-administration, and sharps disposal. Patient supplied medication and needle/syringe.    Patient was given all instructions on how to draw up medication and demonstrated back proper technique. Patient was then successful in self-administration of B12 injection into mid-outer area of the thigh. Patient stated he had no additional questions and was scheduled for follow up labs as ordered by PCP for 9/24/24.    TONEY CrossN, RN (she/her)  [facility] RN

## 2024-06-24 NOTE — PATIENT INSTRUCTIONS
Tinnitus: Care Instructions  Overview     Many people have some ringing sounds in their ears once in a while. You may hear a roar, a hiss, a tinkle, or a buzz. The sound usually lasts only a few minutes. Ringing in the ears that doesn't get better or go away is called tinnitus.  Tinnitus is usually caused by long-term exposure to loud noise. This damages the nerves in the inner ear. It can occur with all types of hearing loss. It may be a symptom of almost any ear problem.  Tinnitus may be caused by a buildup of earwax. Or it may be caused by ear infections or certain medicines (especially antibiotics or large amounts of aspirin). You can also hear noises in your ears because of an injury to the ears or a medical condition.  You may need tests to evaluate your hearing and to find causes of long-lasting tinnitus.  Your doctor may suggest one or more treatments to help you cope with it. You can also do things at home to help reduce symptoms.  Follow-up care is a key part of your treatment and safety. Be sure to make and go to all appointments, and call your doctor if you are having problems. It's also a good idea to know your test results and keep a list of the medicines you take.  How can you care for yourself at home?  Do not smoke or use other tobacco products. Nicotine reduces blood flow to the ear and makes tinnitus worse. If you need help quitting, talk to your doctor about stop-smoking programs and medicines. These can increase your chances of quitting for good.  Talk to your doctor about whether to stop taking aspirin and similar products such as ibuprofen or naproxen.  Get exercise often. It can improve blood flow to the ear.  Ways to cope with noise  Some tinnitus may last a long time. To cope with noise, try to:  Avoid noises that you think caused your tinnitus. If you can't avoid loud noises, wear earplugs or earmuffs.  Ignore the sound by paying attention to other things.  Relax using biofeedback,  "meditation, or yoga. Feeling stressed and being tired can make tinnitus worse.  Play music or white noise to help you sleep. Background noise may cover up the noise that you hear in your ears. You can buy a machine that makes soothing sounds, such as ocean waves.  When should you call for help?   Call 911 anytime you think you may need emergency care. For example, call if:    You have symptoms of a stroke. These may include:  Sudden numbness, tingling, weakness, or loss of movement in your face, arm, or leg, especially on only one side of your body.  Sudden vision changes.  Sudden trouble speaking.  Sudden confusion or trouble understanding simple statements.  Sudden problems with walking or balance.  A sudden, severe headache that is different from past headaches.   Call your doctor now or seek immediate medical care if:    You develop other symptoms. These may include hearing loss (or worse hearing loss), balance problems, dizziness, nausea, or vomiting.   Watch closely for changes in your health, and be sure to contact your doctor if:    Your tinnitus moves from both ears to one ear.     Your hearing loss gets worse within 1 day after an ear injury.     Your tinnitus or hearing loss does not get better within 1 week after an ear injury.     Your tinnitus bothers you enough that you want to take medicines to help you cope with it.   Where can you learn more?  Go to https://www.Never[employer].net/patiented  Enter S165 in the search box to learn more about \"Tinnitus: Care Instructions.\"  Current as of: September 27, 2023               Content Version: 14.0    3100-6342 [employer].   Care instructions adapted under license by your healthcare professional. If you have questions about a medical condition or this instruction, always ask your healthcare professional. [employer] disclaims any warranty or liability for your use of this information.     Hearing Loss: Care Instructions  Overview   "   Hearing loss is a sudden or slow decrease in how well you hear. It can range from slight to profound. Permanent hearing loss can occur with aging. It also can happen when you are exposed long-term to loud noise. Examples include listening to loud music, riding motorcycles, or being around other loud machines.  Hearing loss can affect your work and home life. It can make you feel lonely or depressed. You may feel that you have lost your independence. But hearing aids and other devices can help you hear better and feel connected to others.  Follow-up care is a key part of your treatment and safety. Be sure to make and go to all appointments, and call your doctor if you are having problems. It's also a good idea to know your test results and keep a list of the medicines you take.  How can you care for yourself at home?  Avoid loud noises whenever possible. This helps keep your hearing from getting worse.  Always wear hearing protection around loud noises.  Wear a hearing aid as directed.  A professional can help you pick a hearing aid that will work best for you.  You can also get hearing aids over the counter for mild to moderate hearing loss.  Have hearing tests as your doctor suggests. They can show whether your hearing has changed. Your hearing aid may need to be adjusted.  Use other devices as needed. These may include:  Telephone amplifiers and hearing aids that can connect to a television, stereo, radio, or microphone.  Devices that use lights or vibrations. These alert you to the doorbell, a ringing telephone, or a baby monitor.  Television closed-captioning. This shows the words at the bottom of the screen. Most new TVs can do this.  TTY (text telephone). This lets you type messages back and forth on the telephone instead of talking or listening. These devices are also called TDD. When messages are typed on the keyboard, they are sent over the phone line to a receiving TTY. The message is shown on a  "monitor.  Use text messaging, social media, and email if it is hard for you to communicate by telephone.  Try to learn a listening technique called speechreading. It is not lipreading. You pay attention to people's gestures, expressions, posture, and tone of voice. These clues can help you understand what a person is saying. Face the person you are talking to, and have them face you. Make sure the lighting is good. You need to see the other person's face clearly.  Think about counseling if you need help to adjust to your hearing loss.  When should you call for help?  Watch closely for changes in your health, and be sure to contact your doctor if:    You think your hearing is getting worse.     You have new symptoms, such as dizziness or nausea.   Where can you learn more?  Go to https://www.[employer]!.net/patiented  Enter R798 in the search box to learn more about \"Hearing Loss: Care Instructions.\"  Current as of: September 27, 2023               Content Version: 14.0    2004-2675 [email or web address].   Care instructions adapted under license by your healthcare professional. If you have questions about a medical condition or this instruction, always ask your healthcare professional. [employer] disclaims any warranty or liability for your use of this information.   "

## 2024-06-24 NOTE — PROGRESS NOTES
Assessment & Plan     Patient medically cleared for hearing aids.  Discussed masking and hearing aids.      Problem List Items Addressed This Visit          Nervous and Auditory    Hyperacusis of both ears    Sensorineural hearing loss, bilateral    Tinnitus, bilateral - Primary           20 minutes spent on the date of the encounter doing chart review, history and exam, documentation and further activities per the note  {     LENA Carrion  [facility]    Subjective     HPI     Per 6/15 PRIMARY CARE PROVIDER note:  Bilateral tinnitus is new. No known hearing loss.     7/15 Audio:  Pt reports for about the last year he has noticed tinnitus both ears. Volume of tinnitus can fluctuate. Feels he is more sensitive to sounds than he used be. 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 He denied otalgia, otorrhea, dizziness, aural fullness, and hx of ear surgery. RESULTS: Otoscopy: Clear canals bilat. Tymps: Normal eardrum mobility bilat. Reflexes: Present in ipsi conditions. Unable to seal for contra. Audio: Normal hearing sloping to mild to moderate SNHL, rising back to mild bilat. Slight asymmetry from 2- 4k Hz, right better than left. Improvement with inserts.    6-8 months of tinnitus.  Has noticed hearing loss.  Asking people to repeat themselves more.   He does note increased annoyance with loud noises        Review of Systems   ENT as above      Objective    There were no vitals taken for this visit.    Physical Exam   Constitutional:   The patient was in no acute distress.      Head/Face:   Normocephalic and atraumatic.  No lesions or scars.     Ears:  The tympanic membranes are normal in appearance, bony landmarks are intact.  No retraction, perforation, or masses.   No fluid or purulence was seen in the external canal or the middle ear. No evidence of infection of the middle ear or external canal, cerumen was normal in  appearance.    Nose:  Anterior rhinoscopy revealed midline septum w/ right bony spur and absence of purulence or polyps.      Mouth:  Normal tongue, floor of mouth, buccal mucosa, and palate.  No lesions, ulceration or  masses on inspection, normal voice quality      Oropharynx:  Normal mucosa, palate symmetric with normal elevation. Tonsils  0      Neck:  Supple with normal laryngeal and tracheal landmarks. No palpable thyroid.     Lymphatic:  There is no palpable lymphadenopathy in the neck.

## 2024-06-25 NOTE — TELEPHONE ENCOUNTER
Clinic Navigator sent a [employer] message to inform the Pt that Pt is on the wait list for a sooner appointment. Clinic Navigator will reach out to the Pt via phone call or [employer] when a sooner appointment becomes available.

## 2024-07-15 ENCOUNTER — OFFICE VISIT (OUTPATIENT)
Dept: AUDIOLOGY | Facility: CLINIC | Age: 74
End: 2024-07-15
Attending: FAMILY MEDICINE
Payer: COMMERCIAL

## 2024-07-15 DIAGNOSIS — H90.3 SENSORINEURAL HEARING LOSS (SNHL) OF BOTH EARS: Primary | ICD-10-CM

## 2024-07-15 DIAGNOSIS — H93.13 TINNITUS, BILATERAL: ICD-10-CM

## 2024-07-15 PROCEDURE — 92557 COMPREHENSIVE HEARING TEST: CPT | Performed by: AUDIOLOGIST

## 2024-07-15 PROCEDURE — 92550 TYMPANOMETRY & REFLEX THRESH: CPT | Mod: 52 | Performed by: AUDIOLOGIST

## 2024-07-15 NOTE — PROGRESS NOTES
AUDIOLOGY REPORT    SUMMARY: Audiology visit completed. See audiogram for results.      RECOMMENDATIONS: Follow-up with ENT.    Betty Avitia, CCC-A  [place] Licensed Audiologist #[id number]

## 2024-07-17 ENCOUNTER — OFFICE VISIT (OUTPATIENT)
Dept: OTOLARYNGOLOGY | Facility: CLINIC | Age: 74
End: 2024-07-17
Attending: FAMILY MEDICINE
Payer: COMMERCIAL

## 2024-07-17 DIAGNOSIS — H93.233 HYPERACUSIS OF BOTH EARS: ICD-10-CM

## 2024-07-17 DIAGNOSIS — H90.3 SENSORINEURAL HEARING LOSS, BILATERAL: ICD-10-CM

## 2024-07-17 DIAGNOSIS — H93.13 TINNITUS, BILATERAL: Primary | ICD-10-CM

## 2024-07-17 PROCEDURE — 99202 OFFICE O/P NEW SF 15 MIN: CPT | Performed by: PHYSICIAN ASSISTANT

## 2024-07-17 NOTE — LETTER
7/17/2024      Edin Sousa  [address] [place] [address]      Dear Colleague,    Thank you for referring your patient, Edin Sousa, to the [facility]. Please see a copy of my visit note below.    Assessment & Plan    Patient medically cleared for hearing aids.  Discussed masking and hearing aids.      Problem List Items Addressed This Visit          Nervous and Auditory    Hyperacusis of both ears    Sensorineural hearing loss, bilateral    Tinnitus, bilateral - Primary           20 minutes spent on the date of the encounter doing chart review, history and exam, documentation and further activities per the note  {     LENA Carrion  [facility]    Subjective     HPI     Per 6/15 PRIMARY CARE PROVIDER note:  Bilateral tinnitus is new. No known hearing loss.     7/15 Audio:  Pt reports for about the last year he has noticed tinnitus both ears. Volume of tinnitus can fluctuate. Feels he is more sensitive to sounds than he used be. 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 He denied otalgia, otorrhea, dizziness, aural fullness, and hx of ear surgery. RESULTS: Otoscopy: Clear canals bilat. Tymps: Normal eardrum mobility bilat. Reflexes: Present in ipsi conditions. Unable to seal for contra. Audio: Normal hearing sloping to mild to moderate SNHL, rising back to mild bilat. Slight asymmetry from 2- 4k Hz, right better than left. Improvement with inserts.    6-8 months of tinnitus.  Has noticed hearing loss.  Asking people to repeat themselves more.   He does note increased annoyance with loud noises        Review of Systems   ENT as above      Objective    There were no vitals taken for this visit.    Physical Exam   Constitutional:   The patient was in no acute distress.      Head/Face:   Normocephalic and atraumatic.  No lesions or scars.     Ears:  The tympanic membranes are normal in appearance, bony  landmarks are intact.  No retraction, perforation, or masses.   No fluid or purulence was seen in the external canal or the middle ear. No evidence of infection of the middle ear or external canal, cerumen was normal in appearance.    Nose:  Anterior rhinoscopy revealed midline septum w/ right bony spur and absence of purulence or polyps.      Mouth:  Normal tongue, floor of mouth, buccal mucosa, and palate.  No lesions, ulceration or  masses on inspection, normal voice quality      Oropharynx:  Normal mucosa, palate symmetric with normal elevation. Tonsils  0      Neck:  Supple with normal laryngeal and tracheal landmarks. No palpable thyroid.     Lymphatic:  There is no palpable lymphadenopathy in the neck.                Again, thank you for allowing me to participate in the care of your patient.        Sincerely,        LENA Carrion

## 2024-07-22 NOTE — TELEPHONE ENCOUNTER
REFERRAL INFORMATION:  Referring Provider:  Dr. Brown  Referring Clinic:  [facility]  Reason for Visit/Diagnosis:   K59.09 (ICD-10-CM) - Chronic constipation   R13.10 (ICD-10-CM) - Dysphagia, unspecified type        FUTURE VISIT INFORMATION:  Appointment Date: 8/2/724  Appointment Time:      NOTES STATUS DETAILS   OFFICE NOTE from Referring Provider Internal OV-  6/14/24   MEDICATION LIST Internal         COLONOSCOPY Care Everywhere [facility]-5/1/24   STOOL TESTING Care Everywhere [facility] tests  2/10/22, 2/16/21, 2/14/20   PERTINENT LABS Internal    PATHOLOGY REPORTS (RELATED) In process Requested from [facility]     Records Requested     July 22, 2024 9:06 AM  [email or web address]   Facility  [facility]   Outcome Requested path report from Colonoscopy

## 2024-07-23 NOTE — TELEPHONE ENCOUNTER
Serafinr called to offer Pt a sooner GI appointment. Pt accepted an appointment on 7/24/2024 at 3 PM with Dr. Gurvinder Holman.

## 2024-07-24 ENCOUNTER — OFFICE VISIT (OUTPATIENT)
Dept: GASTROENTEROLOGY | Facility: CLINIC | Age: 74
End: 2024-07-24
Attending: FAMILY MEDICINE
Payer: COMMERCIAL

## 2024-07-24 VITALS
WEIGHT: 183 LBS | HEIGHT: 68 IN | HEART RATE: 82 BPM | DIASTOLIC BLOOD PRESSURE: 80 MMHG | SYSTOLIC BLOOD PRESSURE: 128 MMHG | BODY MASS INDEX: 27.74 KG/M2 | OXYGEN SATURATION: 99 %

## 2024-07-24 DIAGNOSIS — K59.09 CHRONIC CONSTIPATION: ICD-10-CM

## 2024-07-24 DIAGNOSIS — R13.10 DYSPHAGIA, UNSPECIFIED TYPE: ICD-10-CM

## 2024-07-24 PROCEDURE — 99205 OFFICE O/P NEW HI 60 MIN: CPT | Performed by: STUDENT IN AN ORGANIZED HEALTH CARE EDUCATION/TRAINING PROGRAM

## 2024-07-24 ASSESSMENT — PAIN SCALES - GENERAL: PAINLEVEL: NO PAIN (0)

## 2024-07-24 NOTE — PROGRESS NOTES
GI CLINIC VISIT    CC/REFERRING MD:  Argentina Brown  REASON FOR CONSULTATION:   Patient is a 74 year old male who I was asked to see in consultation at the request of Dr. Argentina Brown for   Chief Complaint   Patient presents with    New Patient       ASSESSMENT/PLAN:  Abdominal discomfort  Not clearly constipated given that he is having daily Bms that don't require straining and he feels like he is fully evacuating. However, his symptoms are clearly bothersome to him and an argument could be made that hs is having intermittent constipation that is situational.  I think it is reasonable to trial MiraLAX for these symptoms.  Particularly when he is traveling, being able to use MiraLAX as needed may help him to be able have a bowel movement and feels better.  I suspect that it is stress/environmental changes or changes in his routine when he travels that lead to the symptoms.  Recent colonoscopy with numerous polyps but no good explanation for his constipation.  There was some reported inflammation in the rectum but he is not having any rectal symptoms.  So I think overall it is reasonable to trial MiraLAX and if it does not provide relief we can see him back in clinic.  - MiraLAX as needed    Pill dysphagia   Reports difficulty with larger pills. Needs to use liquids to get them to go down. His regular sized pilled have not been a problem and he denies any food or drink dysphagia. Given history, I don't think this is pathologic. If progressed to smaller pills or became bothersome, would start with SLP eval as I suspect oropharyngeal over esophageal based on history.    Specialty Problems          Gastroenterology Diagnoses    Chronic constipation        Diverticular disease of colon        Dysphagia, unspecified type                RTC PRN      Thank you for this consultation. It was a pleasure to participate in the care of this patient; please contact us with any further questions.  A total of 35 face-to-face minutes was  spent with this patient, >50% of which was counseling regarding the above delineated issues. An additional 34 minutes was spent on the date of the encounter doing chart review, documentation, and further activities as noted above.    Gurvinder Holman  Gastroenterology Fellow, PGY4   Division of Gastroenterology, Hepatology and Nutrition  [facility]    ---------------------------------------------------------------------------------------------------  HPI: 74-year-old with past medical history of vitamin D postpolio myelitis, recent colonoscopy with numerous tubular adenomas.  He is here for self-reported constipation.  He reports that this has been going on his entire life.  He has 1, solid bowel movement today.  He does not have to strain to have it most days.  Intermittently will have to strain mildly.  He does not feel any sensation of incomplete evacuation.    He used to work as a .  He would get up and eat the same breakfast every day and then take a walk.  Would have to wait for couple of hours and then not have a bowel movement.  If he did not have a bowel movement at home he would struggle to have fun at work.  Will get abdominal distention and a sensation of discomfort.  This was a longstanding issue and he managed it with his routine and felt like things were going okay.  He has since retired.  He has maintained the same routine but feels that he has to wait hours to have a bowel movement.  Again, if he goes out to do something before having a bowel movement he will get abdominal distention and discomfort did not feel to have bowel movement.    This has limited his ability to travel.  He is worried about going on any vacations or anything as he will not build to have a bowel movement.  He has trialed some over-the-counter fiber without much change.  Is also tried stool softeners without much change.  Has never tried any osmotic laxatives.  He reports that he is worried about becoming  dependent on them.  Also here for evaluation of dysphagia.  He reports when he tries to swallow large pills they feel like they get stuck for the start going down.  Smaller pills go down without any issue.  No dysphagia to food or drink.    ROS:     ROS: 10 point ROS neg other than the symptoms noted above in the HPI.     PROBLEM LIST  Patient Active Problem List    Diagnosis Date Noted    Sensorineural hearing loss, bilateral 07/17/2024     Priority: Medium    Hyperacusis of both ears 07/17/2024     Priority: Medium    Vitamin B12 deficiency (non anemic) 06/17/2024     Priority: Medium    Chronic constipation 06/14/2024     Priority: Medium    Diverticular disease of colon 06/14/2024     Priority: Medium    History of adenomatous polyp of colon 06/14/2024     Priority: Medium     Brandon endoscopy cscope 5/2024: mild proctitis2/2 from bowel prep, 9 polyps removed mostly tubular adenoma & serrated adenoma, diverticulosis, recheck 1 year      Dysphagia, unspecified type 06/14/2024     Priority: Medium     Only to dry pills, not 2 different textured foods or solids or liquids      Muscle cramps 06/14/2024     Priority: Medium     Sharp very localized anterior abdominal wall mostly with postural changes like bending,      Muscle weakness (generalized) 06/14/2024     Priority: Medium    Left foot pain 06/14/2024     Priority: Medium     lateral edge near navicular, rxay reported neg at [facility], to see podiatry, to get orthtotics      Right knee pain, unspecified chronicity 06/14/2024     Priority: Medium     doing PT ordered by [facility] for arthritis related patella issues      Right foot pain 06/14/2024     Priority: Medium     Lateral edge and ball of fifth toe where has a callus, history of prior fifth toe fracture many years ago, x-rays at [facility] noted negative, to get orthotics      Callus of foot 06/14/2024     Priority: Medium     right ball of fifth toe 1 cm not infected      Enlarged prostate 06/14/2024     Priority: Medium  "    Reported noted on a prior CT abdomen      Nocturia 06/14/2024     Priority: Medium     Once to twice at night not bothersome      Foreskin adhesions 06/14/2024     Priority: Medium     Progressive over many years difficulty retracting compared to before, reported no rash swelling ballooning or recurrent UTIs      Tinnitus, bilateral 06/14/2024     Priority: Medium    History of eczema 06/14/2024     Priority: Medium     Previously having red itchy patches on hip leg finger back, seen by [facility]erm consultants and given clobetasol with resolution now using sparingly as needed      Prediabetes 12/20/2020     Priority: Medium    Postpoliomyelitis syndrome (H28) 02/22/2018     Priority: Medium    Lipoma 02/16/2018     Priority: Medium    Tobacco use 02/16/2018     Priority: Medium       PERTINENT PAST MEDICAL HISTORY:  Past Medical History:   Diagnosis Date    Former smoker 06/14/2024    May have started in his 20s smoked socially less than 1 pack a week off-and-on many years without. thinks may have quit completely by 1980         PREVIOUS SURGERIES:  Past Surgical History:   Procedure Laterality Date    COLONOSCOPY  05/01/2024    COLONOSCOPY      x 2,  15 yrs ago    DENTAL SURGERY      several root canal    LEG SURGERY Left     age 11 polio reconstruction       ALLERGIES:   No Known Allergies    PERTINENT MEDICATIONS:  Current Outpatient Medications   Medication Sig Dispense Refill    Cholecalciferol (VITAMIN D-3 PO)       clobetasol (TEMOVATE) 0.05 % external ointment Apply topically daily as needed      cyanocobalamin (CYANOCOBALAMIN) 1000 MCG/ML injection 1000 mcg IM daily 1 week, then 1 per week for 4 weeks, then 1 per month for 3 months = total 14 ml , then recheck B 12 level 14 mL 0    Folic Acid (FOLATE PO)       insulin syringe-needle U-100 (31G X 5/16\" 1 ML) 31G X 5/16\" 1 ML miscellaneous Use needles and syringes as directed with vitamin B12 injections 20 each 0    Isopropyl Alcohol (ALCOHOL WIPES) 70 % MISC " Externally apply 1 packet topically as needed (for skin prep prior to each vitamin B12 injection) 40 each 0     No current facility-administered medications for this visit.       SOCIAL HISTORY:  Social History     Socioeconomic History    Marital status: Single     Spouse name: Not on file    Number of children: Not on file    Years of education: Not on file    Highest education level: Not on file   Occupational History    Not on file   Tobacco Use    Smoking status: Former     Current packs/day: 0.00     Average packs/day: 0.3 packs/day for 9.0 years (2.3 ttl pk-yrs)     Types: Cigarettes     Start date: 1971     Quit date: 1980     Years since quittin.5     Passive exposure: Past    Smokeless tobacco: Never    Tobacco comments:     Social 1 pack a week , many yrs without , social 1 to 2 yrs, then none 5 yrs  & so on till thinks quit completely in    Vaping Use    Vaping status: Never Used   Substance and Sexual Activity    Alcohol use: Yes     Comment: 1 glass of wine almost 5 days / week at dinner, 8 to 10 oz,    Drug use: Never    Sexual activity: Not Currently   Other Topics Concern    Not on file   Social History Narrative    2024: lives with life partners Ladi, never , no children, has a small dog. Moved back to [place] after being away 40 yrs, was in [place] 8 yrs and prior to that [facility]. Grew up in [place] and [place]. Was a  at a [employer]     Social Determinants of Health     Financial Resource Strain: Not on file   Food Insecurity: Not on file   Transportation Needs: Not on file   Physical Activity: Not on file   Stress: Not on file   Social Connections: Not on file   Interpersonal Safety: Low Risk  (2024)    Interpersonal Safety     Do you feel physically and emotionally safe where you currently live?: Yes     Within the past 12 months, have you been hit, slapped, kicked or otherwise physically hurt by someone?: No     Within the past 12 months,  "have you been humiliated or emotionally abused in other ways by your partner or ex-partner?: No   Housing Stability: Not on file       FAMILY HISTORY:  Family History   Problem Relation Age of Onset    Other - See Comments Mother         digestive issues    Alcoholism Mother     Other - See Comments Father         digestive issues    Alcoholism Father     Bladder Cancer Sister     Parkinsonism Sister     Asthma Sister     Other - See Comments Sister         digestive issues    Bladder Cancer Brother          from it       Past/family/social history reviewed and no changes    PHYSICAL EXAMINATION:  Vitals /80   Pulse 82   Ht 1.727 m (5' 8\")   Wt 83 kg (183 lb)   SpO2 99%   BMI 27.83 kg/m     Wt   Wt Readings from Last 2 Encounters:   24 83 kg (183 lb)   24 81.9 kg (180 lb 8 oz)      Constitutional: No acute distress  Eyes: Sclera anicteric  Ears/nose/mouth/throat: Hearing intact on gross exam  CV: Extremities wwp. No appreciable LE edema  Respiratory: Unlabored breathing  Abdomen: Nondistended, no tenderness to palpation  Skin: warm, perfused, no jaundice  Neuro: AAO x 3  Psych: Normal affect  MSK: Moves all 4 extremities spontaneously       PERTINENT STUDIES:    Office Visit on 2024   Component Date Value Ref Range Status    Sodium 2024 141  135 - 145 mmol/L Final    Potassium 2024 4.6  3.4 - 5.3 mmol/L Final    Carbon Dioxide (CO2) 2024 28  22 - 29 mmol/L Final    Anion Gap 2024 9  7 - 15 mmol/L Final    Urea Nitrogen 2024 14.1  8.0 - 23.0 mg/dL Final    Creatinine 2024 0.85  0.67 - 1.17 mg/dL Final    GFR Estimate 2024 >90  >60 mL/min/1.73m2 Final    Calcium 2024 9.6  8.8 - 10.2 mg/dL Final    Chloride 2024 104  98 - 107 mmol/L Final    Glucose 2024 101 (H)  70 - 99 mg/dL Final    Alkaline Phosphatase 2024 54  40 - 150 U/L Final    AST 2024 19  0 - 45 U/L Final    ALT 2024 15  0 - 70 U/L Final    Protein " Total 06/14/2024 7.4  6.4 - 8.3 g/dL Final    Albumin 06/14/2024 4.6  3.5 - 5.2 g/dL Final    Bilirubin Total 06/14/2024 0.3  <=1.2 mg/dL Final    TSH 06/14/2024 3.49  0.30 - 4.20 uIU/mL Final    Hemoglobin A1C 06/14/2024 6.1 (H)  0.0 - 5.6 % Final    Magnesium 06/14/2024 2.3  1.7 - 2.3 mg/dL Final    Phosphorus 06/14/2024 3.6  2.5 - 4.5 mg/dL Final    Vitamin B12 06/14/2024 <150 (L)  232 - 1,245 pg/mL Final    Ferritin 06/14/2024 110  31 - 409 ng/mL Final    Iron 06/14/2024 75  61 - 157 ug/dL Final    Iron Binding Capacity 06/14/2024 278  240 - 430 ug/dL Final    Iron Sat Index 06/14/2024 27  15 - 46 % Final    Tissue Transglutaminase Antibody I* 06/14/2024 0.3  <7.0 U/mL Final    Tissue Transglutaminase Antibody I* 06/14/2024 <0.6  <7.0 U/mL Final    WBC Count 06/14/2024 6.6  4.0 - 11.0 10e3/uL Final    RBC Count 06/14/2024 4.74  4.40 - 5.90 10e6/uL Final    Hemoglobin 06/14/2024 14.1  13.3 - 17.7 g/dL Final    Hematocrit 06/14/2024 43.2  40.0 - 53.0 % Final    MCV 06/14/2024 91  78 - 100 fL Final    MCH 06/14/2024 29.7  26.5 - 33.0 pg Final    MCHC 06/14/2024 32.6  31.5 - 36.5 g/dL Final    RDW 06/14/2024 13.5  10.0 - 15.0 % Final    Platelet Count 06/14/2024 256  150 - 450 10e3/uL Final    % Neutrophils 06/14/2024 58  % Final    % Lymphocytes 06/14/2024 25  % Final    % Monocytes 06/14/2024 11  % Final    % Eosinophils 06/14/2024 6  % Final    % Basophils 06/14/2024 1  % Final    % Immature Granulocytes 06/14/2024 0  % Final    Absolute Neutrophils 06/14/2024 3.8  1.6 - 8.3 10e3/uL Final    Absolute Lymphocytes 06/14/2024 1.7  0.8 - 5.3 10e3/uL Final    Absolute Monocytes 06/14/2024 0.7  0.0 - 1.3 10e3/uL Final    Absolute Eosinophils 06/14/2024 0.4  0.0 - 0.7 10e3/uL Final    Absolute Basophils 06/14/2024 0.1  0.0 - 0.2 10e3/uL Final    Absolute Immature Granulocytes 06/14/2024 0.0  <=0.4 10e3/uL Final

## 2024-07-24 NOTE — PATIENT INSTRUCTIONS
- Please schedule a follow visit in the GI clinic as needed  - Medication changes today: We will try miralax. You can start with one capful and increase or decrease as needed.     If you have any questions, please don't hesitate to contact me through our GI RN Clinic Coordinator, Clarice Leo, at [phone].

## 2024-07-24 NOTE — LETTER
7/24/2024      Edin Sousa  [address]  [place] [address]      Dear Colleague,    Thank you for referring your patient, Edin Sousa, to the [facility] [place]. Please see a copy of my visit note below.    GI CLINIC VISIT    CC/REFERRING MD:  Argentina Brown  REASON FOR CONSULTATION:   Patient is a 74 year old male who I was asked to see in consultation at the request of Dr. Argentina Brown for   Chief Complaint   Patient presents with    New Patient       ASSESSMENT/PLAN:  Abdominal discomfort  Not clearly constipated given that he is having daily Bms that don't require straining and he feels like he is fully evacuating. However, his symptoms are clearly bothersome to him and an argument could be made that hs is having intermittent constipation that is situational.  I think it is reasonable to trial MiraLAX for these symptoms.  Particularly when he is traveling, being able to use MiraLAX as needed may help him to be able have a bowel movement and feels better.  I suspect that it is stress/environmental changes or changes in his routine when he travels that lead to the symptoms.  Recent colonoscopy with numerous polyps but no good explanation for his constipation.  There was some reported inflammation in the rectum but he is not having any rectal symptoms.  So I think overall it is reasonable to trial MiraLAX and if it does not provide relief we can see him back in clinic.  - MiraLAX as needed    Pill dysphagia   Reports difficulty with larger pills. Needs to use liquids to get them to go down. His regular sized pilled have not been a problem and he denies any food or drink dysphagia. Given history, I don't think this is pathologic. If progressed to smaller pills or became bothersome, would start with SLP marilu as I suspect oropharyngeal over esophageal based on history.    Specialty Problems          Gastroenterology Diagnoses    Chronic constipation        Diverticular  disease of colon        Dysphagia, unspecified type                RTC PRN      Thank you for this consultation. It was a pleasure to participate in the care of this patient; please contact us with any further questions.  A total of 35 face-to-face minutes was spent with this patient, >50% of which was counseling regarding the above delineated issues. An additional 34 minutes was spent on the date of the encounter doing chart review, documentation, and further activities as noted above.    Gurvinder Holman  Gastroenterology Fellow, PGY4   Division of Gastroenterology, Hepatology and Nutrition  [facility]    ---------------------------------------------------------------------------------------------------  HPI: 74-year-old with past medical history of vitamin D postpolio myelitis, recent colonoscopy with numerous tubular adenomas.  He is here for self-reported constipation.  He reports that this has been going on his entire life.  He has 1, solid bowel movement today.  He does not have to strain to have it most days.  Intermittently will have to strain mildly.  He does not feel any sensation of incomplete evacuation.    He used to work as a .  He would get up and eat the same breakfast every day and then take a walk.  Would have to wait for couple of hours and then not have a bowel movement.  If he did not have a bowel movement at home he would struggle to have fun at work.  Will get abdominal distention and a sensation of discomfort.  This was a longstanding issue and he managed it with his routine and felt like things were going okay.  He has since retired.  He has maintained the same routine but feels that he has to wait hours to have a bowel movement.  Again, if he goes out to do something before having a bowel movement he will get abdominal distention and discomfort did not feel to have bowel movement.    This has limited his ability to travel.  He is worried about going on any vacations or  anything as he will not build to have a bowel movement.  He has trialed some over-the-counter fiber without much change.  Is also tried stool softeners without much change.  Has never tried any osmotic laxatives.  He reports that he is worried about becoming dependent on them.  Also here for evaluation of dysphagia.  He reports when he tries to swallow large pills they feel like they get stuck for the start going down.  Smaller pills go down without any issue.  No dysphagia to food or drink.    ROS:     ROS: 10 point ROS neg other than the symptoms noted above in the HPI.     PROBLEM LIST  Patient Active Problem List    Diagnosis Date Noted    Sensorineural hearing loss, bilateral 07/17/2024     Priority: Medium    Hyperacusis of both ears 07/17/2024     Priority: Medium    Vitamin B12 deficiency (non anemic) 06/17/2024     Priority: Medium    Chronic constipation 06/14/2024     Priority: Medium    Diverticular disease of colon 06/14/2024     Priority: Medium    History of adenomatous polyp of colon 06/14/2024     Priority: Medium     [place] [facility] cscope 5/2024: mild proctitis2/2 from bowel prep, 9 polyps removed mostly tubular adenoma & serrated adenoma, diverticulosis, recheck 1 year      Dysphagia, unspecified type 06/14/2024     Priority: Medium     Only to dry pills, not 2 different textured foods or solids or liquids      Muscle cramps 06/14/2024     Priority: Medium     Sharp very localized anterior abdominal wall mostly with postural changes like bending,      Muscle weakness (generalized) 06/14/2024     Priority: Medium    Left foot pain 06/14/2024     Priority: Medium     lateral edge near navicular, rxay reported neg at [facility], to see podiatry, to get orthtotics      Right knee pain, unspecified chronicity 06/14/2024     Priority: Medium     doing PT ordered by [facility] for arthritis related patella issues      Right foot pain 06/14/2024     Priority: Medium     Lateral edge and ball of fifth toe where has a  callus, history of prior fifth toe fracture many years ago, x-rays at [facility] noted negative, to get orthotics      Callus of foot 06/14/2024     Priority: Medium     right ball of fifth toe 1 cm not infected      Enlarged prostate 06/14/2024     Priority: Medium     Reported noted on a prior CT abdomen      Nocturia 06/14/2024     Priority: Medium     Once to twice at night not bothersome      Foreskin adhesions 06/14/2024     Priority: Medium     Progressive over many years difficulty retracting compared to before, reported no rash swelling ballooning or recurrent UTIs      Tinnitus, bilateral 06/14/2024     Priority: Medium    History of eczema 06/14/2024     Priority: Medium     Previously having red itchy patches on hip leg finger back, seen by [facility] consultants and given clobetasol with resolution now using sparingly as needed      Prediabetes 12/20/2020     Priority: Medium    Postpoliomyelitis syndrome (H28) 02/22/2018     Priority: Medium    Lipoma 02/16/2018     Priority: Medium    Tobacco use 02/16/2018     Priority: Medium       PERTINENT PAST MEDICAL HISTORY:  Past Medical History:   Diagnosis Date    Former smoker 06/14/2024    May have started in his 20s smoked socially less than 1 pack a week off-and-on many years without. thinks may have quit completely by 1980         PREVIOUS SURGERIES:  Past Surgical History:   Procedure Laterality Date    COLONOSCOPY  05/01/2024    COLONOSCOPY      x 2,  15 yrs ago    DENTAL SURGERY      several root canal    LEG SURGERY Left     age 11 polio reconstruction       ALLERGIES:   No Known Allergies    PERTINENT MEDICATIONS:  Current Outpatient Medications   Medication Sig Dispense Refill    Cholecalciferol (VITAMIN D-3 PO)       clobetasol (TEMOVATE) 0.05 % external ointment Apply topically daily as needed      cyanocobalamin (CYANOCOBALAMIN) 1000 MCG/ML injection 1000 mcg IM daily 1 week, then 1 per week for 4 weeks, then 1 per month for 3 months = total 14 ml , then  "recheck B 12 level 14 mL 0    Folic Acid (FOLATE PO)       insulin syringe-needle U-100 (31G X 16\" 1 ML) 31G X 5/16\" 1 ML miscellaneous Use needles and syringes as directed with vitamin B12 injections 20 each 0    Isopropyl Alcohol (ALCOHOL WIPES) 70 % MISC Externally apply 1 packet topically as needed (for skin prep prior to each vitamin B12 injection) 40 each 0     No current facility-administered medications for this visit.       SOCIAL HISTORY:  Social History     Socioeconomic History    Marital status: Single     Spouse name: Not on file    Number of children: Not on file    Years of education: Not on file    Highest education level: Not on file   Occupational History    Not on file   Tobacco Use    Smoking status: Former     Current packs/day: 0.00     Average packs/day: 0.3 packs/day for 9.0 years (2.3 ttl pk-yrs)     Types: Cigarettes     Start date: 1971     Quit date: 1980     Years since quittin.5     Passive exposure: Past    Smokeless tobacco: Never    Tobacco comments:     Social 1 pack a week , many yrs without , social 1 to 2 yrs, then none 5 yrs  & so on till thinks quit completely in    Vaping Use    Vaping status: Never Used   Substance and Sexual Activity    Alcohol use: Yes     Comment: 1 glass of wine almost 5 days / week at dinner, 8 to 10 oz,    Drug use: Never    Sexual activity: Not Currently   Other Topics Concern    Not on file   Social History Narrative    2024: lives with life partners Ladi, never , no children, has a small dog. Moved back to [place] after being away 40 yrs, was in [place] 8 yrs and prior to that [facility]. Grew up in [place] and [place]. Was a  at a [employer]     Social Determinants of Health     Financial Resource Strain: Not on file   Food Insecurity: Not on file   Transportation Needs: Not on file   Physical Activity: Not on file   Stress: Not on file   Social Connections: Not on file   Interpersonal Safety: Low Risk  " "(2024)    Interpersonal Safety     Do you feel physically and emotionally safe where you currently live?: Yes     Within the past 12 months, have you been hit, slapped, kicked or otherwise physically hurt by someone?: No     Within the past 12 months, have you been humiliated or emotionally abused in other ways by your partner or ex-partner?: No   Housing Stability: Not on file       FAMILY HISTORY:  Family History   Problem Relation Age of Onset    Other - See Comments Mother         digestive issues    Alcoholism Mother     Other - See Comments Father         digestive issues    Alcoholism Father     Bladder Cancer Sister     Parkinsonism Sister     Asthma Sister     Other - See Comments Sister         digestive issues    Bladder Cancer Brother          from it       Past/family/social history reviewed and no changes    PHYSICAL EXAMINATION:  Vitals /80   Pulse 82   Ht 1.727 m (5' 8\")   Wt 83 kg (183 lb)   SpO2 99%   BMI 27.83 kg/m     Wt   Wt Readings from Last 2 Encounters:   24 83 kg (183 lb)   24 81.9 kg (180 lb 8 oz)      Constitutional: No acute distress  Eyes: Sclera anicteric  Ears/nose/mouth/throat: Hearing intact on gross exam  CV: Extremities wwp. No appreciable LE edema  Respiratory: Unlabored breathing  Abdomen: Nondistended, no tenderness to palpation  Skin: warm, perfused, no jaundice  Neuro: AAO x 3  Psych: Normal affect  MSK: Moves all 4 extremities spontaneously       PERTINENT STUDIES:    Office Visit on 2024   Component Date Value Ref Range Status    Sodium 2024 141  135 - 145 mmol/L Final    Potassium 2024 4.6  3.4 - 5.3 mmol/L Final    Carbon Dioxide (CO2) 2024 28  22 - 29 mmol/L Final    Anion Gap 2024 9  7 - 15 mmol/L Final    Urea Nitrogen 2024 14.1  8.0 - 23.0 mg/dL Final    Creatinine 2024 0.85  0.67 - 1.17 mg/dL Final    GFR Estimate 2024 >90  >60 mL/min/1.73m2 Final    Calcium 2024 9.6  8.8 - 10.2 " mg/dL Final    Chloride 06/14/2024 104  98 - 107 mmol/L Final    Glucose 06/14/2024 101 (H)  70 - 99 mg/dL Final    Alkaline Phosphatase 06/14/2024 54  40 - 150 U/L Final    AST 06/14/2024 19  0 - 45 U/L Final    ALT 06/14/2024 15  0 - 70 U/L Final    Protein Total 06/14/2024 7.4  6.4 - 8.3 g/dL Final    Albumin 06/14/2024 4.6  3.5 - 5.2 g/dL Final    Bilirubin Total 06/14/2024 0.3  <=1.2 mg/dL Final    TSH 06/14/2024 3.49  0.30 - 4.20 uIU/mL Final    Hemoglobin A1C 06/14/2024 6.1 (H)  0.0 - 5.6 % Final    Magnesium 06/14/2024 2.3  1.7 - 2.3 mg/dL Final    Phosphorus 06/14/2024 3.6  2.5 - 4.5 mg/dL Final    Vitamin B12 06/14/2024 <150 (L)  232 - 1,245 pg/mL Final    Ferritin 06/14/2024 110  31 - 409 ng/mL Final    Iron 06/14/2024 75  61 - 157 ug/dL Final    Iron Binding Capacity 06/14/2024 278  240 - 430 ug/dL Final    Iron Sat Index 06/14/2024 27  15 - 46 % Final    Tissue Transglutaminase Antibody I* 06/14/2024 0.3  <7.0 U/mL Final    Tissue Transglutaminase Antibody I* 06/14/2024 <0.6  <7.0 U/mL Final    WBC Count 06/14/2024 6.6  4.0 - 11.0 10e3/uL Final    RBC Count 06/14/2024 4.74  4.40 - 5.90 10e6/uL Final    Hemoglobin 06/14/2024 14.1  13.3 - 17.7 g/dL Final    Hematocrit 06/14/2024 43.2  40.0 - 53.0 % Final    MCV 06/14/2024 91  78 - 100 fL Final    MCH 06/14/2024 29.7  26.5 - 33.0 pg Final    MCHC 06/14/2024 32.6  31.5 - 36.5 g/dL Final    RDW 06/14/2024 13.5  10.0 - 15.0 % Final    Platelet Count 06/14/2024 256  150 - 450 10e3/uL Final    % Neutrophils 06/14/2024 58  % Final    % Lymphocytes 06/14/2024 25  % Final    % Monocytes 06/14/2024 11  % Final    % Eosinophils 06/14/2024 6  % Final    % Basophils 06/14/2024 1  % Final    % Immature Granulocytes 06/14/2024 0  % Final    Absolute Neutrophils 06/14/2024 3.8  1.6 - 8.3 10e3/uL Final    Absolute Lymphocytes 06/14/2024 1.7  0.8 - 5.3 10e3/uL Final    Absolute Monocytes 06/14/2024 0.7  0.0 - 1.3 10e3/uL Final    Absolute Eosinophils 06/14/2024 0.4  0.0 -  0.7 10e3/uL Final    Absolute Basophils 06/14/2024 0.1  0.0 - 0.2 10e3/uL Final    Absolute Immature Granulocytes 06/14/2024 0.0  <=0.4 10e3/uL Final            Attestation signed by Ameena Torres MD at 7/26/2024  2:46 PM (Updated):    Physician Attestation  I, Ameena Torres MD, saw this patient and agree with the findings and plan of care as documented in the note.      Items personally reviewed/procedural attestation: vitals and prior endoscopic procedures an associated pathology reports.    Periods of abdominal fullness and discomfort when daily BM is delayed, though rarely misses a day without a BM. No current rectal symptoms. Discussed mechanism of action of osmotic laxatives and rationale for use as well as dosing ranges. Questions addressed.     Reassuringly - recent colonoscopy (done outside our system) without cause for constipation. Noted  numerous adenomatous polyps with plan for follow-up in one year. Mild nonspecific inflammation seen on rectal biopsies and patient denying rectal symptoms at this time. Would continue to monitor with planned repeat colonoscopy within the year.     GI clinic follow-up as needed.     Ameena Torres MD      I spent 20 minutes with the patient, of which > 50% was spent face-to-face with the patient in education, counseling, and addressing questions regarding the above diagnoses.   An additional 40 minutes was spent on the date of the encounter doing chart review (notes, labs, imaging reports, endoscopic and pathology reports, clinical status events, medications, etc)  documentation, care coodination, and arranging of care plan with the fellow.          Again, thank you for allowing me to participate in the care of your patient.      Sincerely,    Gurvinder Holman MD

## 2024-07-24 NOTE — NURSING NOTE
"Chief Complaint   Patient presents with    New Patient       Vitals:    07/24/24 1458   BP: 128/80   Pulse: 82   SpO2: 99%   Weight: 83 kg (183 lb)   Height: 1.727 m (5' 8\")       Body mass index is 27.83 kg/m .    Marianne [employer]    "

## 2024-08-02 ENCOUNTER — PRE VISIT (OUTPATIENT)
Dept: UROLOGY | Facility: CLINIC | Age: 74
End: 2024-08-02
Payer: COMMERCIAL

## 2024-08-02 NOTE — TELEPHONE ENCOUNTER
Reason for visit: Enlarged prostate     Relevant information: nocturia 1-2x a night, foreskin adhesions    Records/imaging/labs/orders: all records available    Pt called: no need for a call    At Rooming:  Standard rooming      Aaron Blunt  8/2/2024  2:10 PM

## 2024-08-15 ENCOUNTER — NURSE TRIAGE (OUTPATIENT)
Dept: FAMILY MEDICINE | Facility: CLINIC | Age: 74
End: 2024-08-15
Payer: COMMERCIAL

## 2024-08-15 DIAGNOSIS — U07.1 INFECTION DUE TO 2019 NOVEL CORONAVIRUS: Primary | ICD-10-CM

## 2024-08-15 NOTE — TELEPHONE ENCOUNTER
Call received from patient:  COVID-19 test today positive  Symptoms began yesterday  Sore throat, sneezing, headache, fatigue, body aches  No chest pain nor difficulty breathing  Does not think has fever  No loss of taste nor smell  No diarrhea    RN COVID TREATMENT VISIT  08/15/24      The patient has been triaged and does not require a higher level of care.    Edin Sousa  74 year old  Current weight? 180 lbs    Has the patient been seen by a primary care provider at an [facility] or [facility] within the past two years? Yes.   Have you been in close proximity to/do you have a known exposure to a person with a confirmed case of influenza? No.     General treatment eligibility:  Date of positive COVID test (PCR or at home)?  8/15/24    Are you or have you been hospitalized for this COVID-19 infection? No.   Have you received monoclonal antibodies or antiviral treatment for COVID-19 since this positive test? No.   Do you have any of the following conditions that place you at risk of being very sick from COVID-19?   - Age 50 years or older  Yes, patient has at least one high risk condition as noted above.     Current COVID symptoms:   - fatigue  - muscle or body aches  - headache  - sore throat  Yes. Patient has at least one symptom as selected.     How many days since symptoms started? 5 days or less. Established patient, 12 years or older weighing at least 88.2 lbs, who has symptoms that started in the past 5 days, has not been hospitalized nor received treatment already, and is at risk for being very sick from COVID-19.     Treatment eligibility by RN:  Are you currently pregnant or nursing? No  Do you have a clinically significant hypersensitivity to nirmatrelvir or ritonavir, or toxic epidermal necrolysis (TEN) or Duff-Teja Syndrome? No  Do you have a history of hepatitis, any hepatic impairment on the Problem List (such as Child-Hernandez Class C, cirrhosis, fatty liver  "disease, alcoholic liver disease), or was the last liver lab (hepatic panel, ALT, AST, ALK Phos, bilirubin) elevated in the past 6 months? No  Do you have any history of severe renal impairment (eGFR < 30mL/min)? No    Is patient eligible to continue? Yes, patient meets all eligibility requirements for the RN COVID treatment (as denoted by all no responses above).     Current Outpatient Medications   Medication Sig Dispense Refill    Cholecalciferol (VITAMIN D-3 PO)       clobetasol (TEMOVATE) 0.05 % external ointment Apply topically daily as needed      cyanocobalamin (CYANOCOBALAMIN) 1000 MCG/ML injection 1000 mcg IM daily 1 week, then 1 per week for 4 weeks, then 1 per month for 3 months = total 14 ml , then recheck B 12 level 14 mL 0    Folic Acid (FOLATE PO)       insulin syringe-needle U-100 (31G X 5/16\" 1 ML) 31G X 5/16\" 1 ML miscellaneous Use needles and syringes as directed with vitamin B12 injections 20 each 0    Isopropyl Alcohol (ALCOHOL WIPES) 70 % MISC Externally apply 1 packet topically as needed (for skin prep prior to each vitamin B12 injection) 40 each 0   Patient reported occasional Miralax use.    Medications from List 1 of the standing order (on medications that exclude the use of Paxlovid) that patient is taking: NONE. Is patient taking Guille's Wort? No  Is patient taking Guille's Wort or any meds from List 1? No.   Medications from List 2 of the standing order (on meds that provider needs to adjust) that patient is taking: NONE. Is patient on any of the meds from List 2? No.   Medications from List 3 of standing order (on meds that a RN needs to adjust) that patient is taking: NONE. Is patient on any meds from List 3? No.     Paxlovid has an approximate 90% reduction in hospitalization. Paxlovid can possibly cause altered sense of taste, diarrhea (loose, watery stools), high blood pressure, muscle aches.     Would patient like a Paxlovid prescription?   Yes.   Lab Results   Component " Value Date    GFRESTIMATED >90 06/14/2024       Was last eGFR reduced? No, eGFR 60 or greater/ No Result on record. Patient can receive the normal renal function dose. Paxlovid Rx sent to [facility] pharmacy    Temporary change to home medications: None    All medication adjustments (holds, etc) were discussed with the patient and patient was asked to repeat back (teachback) their med adjustment.  Did patient understand med adjustment? No medication adjustments needed.         Reviewed the following instructions with the patient:    Paxlovid (nimatrelvir and ritonavir)    How it works  Two medicines (nirmatrelvir and ritonavir) are taken together. They stop the virus from growing. Less amount of virus is easier for your body to fight.    How to take  Medicine comes in a daily container with both medicine tablets. Take by mouth twice daily (once in the morning, once at night) for 5 days.  The number of tablets to take varies by patient.  Don't chew or break capsules. Swallow whole.    When to take  Take as soon as possible after positive COVID-19 test result, and within 5 days of your first symptoms.    Possible side effects  Can cause altered sense of taste, diarrhea (loose, watery stools), high blood pressure, muscle aches.    Encouraged patient to call back with any new, changing, or worsening symptoms, or any questions or concerns.    GEOFFREY Bowen, RN-[facility]        Reason for Disposition   Fever > 100.0 F (37.8 C) and bedridden (e.g., CVA, chronic illness, recovering from surgery)    Additional Information   Negative: SEVERE difficulty breathing (e.g., struggling for each breath, speaks in single words)   Negative: Difficult to awaken or acting confused (e.g., disoriented, slurred speech)   Negative: Bluish (or gray) lips or face now   Negative: Shock suspected (e.g., cold/pale/clammy skin, too weak to stand, low BP, rapid pulse)   Negative: Sounds like a life-threatening emergency  to the triager   Negative: Diagnosed or suspected COVID-19 and symptoms lasting 3 or more weeks   Negative: COVID-19 exposure and no symptoms   Negative: COVID-19 vaccine reaction suspected (e.g., fever, headache, muscle aches) occurring 1 to 3 days after getting vaccine   Negative: COVID-19 vaccine, questions about   Negative: Lives with someone known to have influenza (flu test positive) and flu-like symptoms (e.g., cough, runny nose, sore throat, SOB; with or without fever)   Negative: Possible COVID-19 symptoms and triager concerned about severity of symptoms or other causes   Negative: COVID-19 and breastfeeding, questions about   Negative: SEVERE or constant chest pain or pressure  (Exception: Mild central chest pain, present only when coughing.)   Negative: MODERATE difficulty breathing (e.g., speaks in phrases, SOB even at rest, pulse 100-120)   Negative: Headache and stiff neck (can't touch chin to chest)   Negative: Oxygen level (e.g., pulse oximetry) 90% or lower   Negative: Chest pain or pressure  (Exception: MILD central chest pain, present only when coughing.)   Negative: Drinking very little and dehydration suspected (e.g., no urine > 12 hours, very dry mouth, very lightheaded)   Negative: Patient sounds very sick or weak to the triager   Negative: MILD difficulty breathing (e.g., minimal/no SOB at rest, SOB with walking, pulse <100)   Negative: Fever > 103 F (39.4 C)   Negative: Fever > 101 F (38.3 C) and over 60 years of age    Protocols used: Coronavirus (COVID-19) Diagnosed or [id number]

## 2024-08-27 ENCOUNTER — PRE VISIT (OUTPATIENT)
Dept: GASTROENTEROLOGY | Facility: CLINIC | Age: 74
End: 2024-08-27

## 2024-08-29 ENCOUNTER — THERAPY VISIT (OUTPATIENT)
Dept: PHYSICAL THERAPY | Facility: CLINIC | Age: 74
End: 2024-08-29
Attending: FAMILY MEDICINE
Payer: COMMERCIAL

## 2024-08-29 DIAGNOSIS — K59.09 CHRONIC CONSTIPATION: ICD-10-CM

## 2024-08-29 PROCEDURE — 97161 PT EVAL LOW COMPLEX 20 MIN: CPT | Mod: GP | Performed by: PHYSICAL THERAPIST

## 2024-08-29 PROCEDURE — 97535 SELF CARE MNGMENT TRAINING: CPT | Mod: GP | Performed by: PHYSICAL THERAPIST

## 2024-08-29 NOTE — PROGRESS NOTES
PHYSICAL THERAPY EVALUATION  Type of Visit: Evaluation       Fall Risk Screen:  Fall screen completed by: PT  Have you fallen 2 or more times in the past year?: No  Have you fallen and had an injury in the past year?: No  Is patient a fall risk?: No    Subjective       Presenting condition or subjective complaint: UncertainPeriods of abdominal fullness and discomfort when daily BM is delayed, though rarely misses a day without a BM. No current rectal symptoms.  Recently started Miralax and thinks its helping.  BM used to be in the morning, now moving to later in the day.  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 1961    Prior diagnostic imaging/testing results:     Normal colonoscopy  Prior therapy history for the same diagnosis, illness or injury: No          Living Environment  Social support: With a significant other or spouse   Type of home: House   Stairs to enter the home: Yes 12 Is there a railing: Yes     Ramp: No   Stairs inside the home: Yes 16 Is there a railing: Yes     Help at home: None  Equipment owned:       Employment: No    Hobbies/Interests: Withings practice and performance. Reading. Writing.    Patient goals for therapy: Uncertain    Pain assessment:  not present currently but does have intermitant abdominal pain     Objective      PELVIC EVALUATION  ADDITIONAL HISTORY:  Sex assigned at birth: Male  Gender identity: Male    Pronouns:        Bladder History:  Feels bladder filling: Yes  Triggers for feeling of inability to wait to go to the bathroom: No    How long can you wait to urinate:    Gets up at night to urinate: Yes 2 to 3  Can stop the flow of urine when urinating: Yes  Volume of urine usually released: Medium   Other issues: Slow or hesitant urine stream  Number of bladder infections in last 12 months:    Fluid intake per day: 24 oz 16 oz 8 oz  Medications taken  for bladder: No     Activities causing urine leak:      Amount of urine typically leaked:    Pads used to help with leaking:          Bowel History:  Frequency of bowel movement: Daily  Consistency of stool: Soft-formed    Ignores the urge to defecate: Sometimes  Other bowel issues: Straining to have bowel movement  Length of time spent trying to have a bowel movement: 5 to 15 minutes  Bowel schedule: wakes, walks dog, eats breakfast slowly- small banana, fiber toast, juice/coffee.  Usually will rest after breakfast.  May go to the gym.  Generally inactive in afternoon. Miralax in the late evening.  Urge will come eventually, minimal straining, Type 3-4.  Will drink water, juice and 1 glass of wine in evening.  Sexual Function History:  Sexual orientation: Straight    Sexually active: No  Lubrication used: No No  Pelvic pain:      Pain or difficulty with orgasms/erection/ejaculation: No    State of menopause:    Hormone medications: No      Have you tried pelvic floor strengthening exercises for 4 weeks: No      Discussed reason for referral regarding pelvic health needs and external/internal pelvic floor muscle examination with patient/guardian.  Opportunity provided to ask questions and verbal consent for assessment and intervention was given.      PELVIC EXAM  External Visual Inspection: next      BIOFEEDBACK: next      Assessment & Plan   CLINICAL IMPRESSIONS  Medical Diagnosis: Chronic Constipation    Treatment Diagnosis: Chronic Constipation, somatic dysfunction of pelvic region   Impression/Assessment: Patient is a 74 year old male with constipation complaints.  The following significant findings have been identified: Impaired muscle performance. These impairments interfere with their ability to perform self care tasks as compared to previous level of function.     Clinical Decision Making (Complexity):  Clinical Presentation: Stable/Uncomplicated  Clinical Presentation Rationale: based on medical and  personal factors listed in PT evaluation  Clinical Decision Making (Complexity): Low complexity    PLAN OF CARE  Treatment Interventions:  Modalities: Biofeedback  Interventions: Neuromuscular Re-education, Therapeutic Exercise, Self-Care/Home Management    Long Term Goals     PT Goal 1  Goal Identifier: Self-Management Pelvic  Goal Description: In 6 weeks patient will verbalize/demonstrate pro-active self-care measures to improve pelvic floor function including bladder/bowel hygiene; activities to reduce stress and anxiety; and prescribed exercises for pelvic floor function with the assistance of educational materials from physical therapy sessions.  Rationale: to maximize safety and independence with self cares;to maximize safety and independence with performance of ADLs and functional tasks  Target Date: 10/11/24  PT Goal 2  Goal Identifier: Bowel  Goal Description: In 12 weeks pt will demonstrate type 3-5 stool with 1x/day BM at least 5 times per week  Rationale: to maximize safety and independence with self cares;to maximize safety and independence with performance of ADLs and functional tasks;to maximize safety and independence within the community  Target Date: 11/22/24  PT Goal 3  Goal Identifier: Nocturia  Goal Description: In 6 weeks patient will reduce nocturia episodes from waking up 4 times per night to waking up 2 times per night.  In 12 weeks patient will decrease to 1x/night.  Rationale: to maximize safety and independence with performance of ADLs and functional tasks;to maximize safety and independence with self cares;to maximize safety and independence within the home  Target Date: 11/22/24      Frequency of Treatment: 1x/week for 3 weeks then 1x every 3 weeks  Duration of Treatment: 12 weeks    Recommended Referrals to Other Professionals: Physical Therapy  Education Assessment:   Learner/Method: Patient;Demonstration;Pictures/Video;No Barriers to Learning  Education Comments: Verb understanding  regarding access to exercises and resources    Risks and benefits of evaluation/treatment have been explained.   Patient/Family/caregiver agrees with Plan of Care.     Evaluation Time:     PT Eval, Low Complexity Minutes (18085): 20       Signing Clinician: LISETTE Woods [facility]                                                                                   OUTPATIENT PHYSICAL THERAPY      PLAN OF TREATMENT FOR OUTPATIENT REHABILITATION   Patient's Last Name, First Name, Edin Monroy YOB: 1950   Provider's Name   [facility]   Medical Record No.  [id number]     Onset Date: 07/24/24  Start of Care Date: 08/29/24     Medical Diagnosis:  Chronic Constipation      PT Treatment Diagnosis:  Chronic Constipation, somatic dysfunction of pelvic region Plan of Treatment  Frequency/Duration: 1x/week for 3 weeks then 1x every 3 weeks/ 12 weeks    Certification date from 08/29/24 to 11/22/24         See note for plan of treatment details and functional goals     Ivy Mina PT                         I CERTIFY THE NEED FOR THESE SERVICES FURNISHED UNDER        THIS PLAN OF TREATMENT AND WHILE UNDER MY CARE     (Physician attestation of this document indicates review and certification of the therapy plan).              Referring Provider:  Argentina Brown    Initial Assessment  See Epic Evaluation- Start of Care Date: 08/29/24

## 2024-08-30 NOTE — PROGRESS NOTES
Subjective     REFERRING PROVIDER  Argentina Brown MD    REASON FOR VISIT  Nocturia and foreskin adhesions    HISTORY OF PRESENT ILLNESS  Mr. Sousa is a pleasant 74 year old male with a past medical history significant for chronic constipation, postpolio syndrome, prediabetes, tobacco use, and eczema who presents today for discussion of his bothersome nocturia and foreskin adhesions.  I personally reviewed the family practice visit note from 6/14/2024 in preparation for today's visit.    According to that note, Mr. Sousa endorsed bothersome nocturia with no other bothersome urinary symptoms in the setting of an enlarged prostate seen on CT scan many years ago as well as difficulty retracting his foreskin that has been getting worse over many years.  Did not endorse any recurrent infections at that time.  Was referred to urology for discussion of these findings.    Today:  Phimosis  Foreskin adhesions present for 15 years, getting worse   No pain at baseline, but pain with retraction and tension   No previous intervention  Adhesions are worse int he morning, but improve throughout the day, but can never get the foreskin past the back of the glans  No history of balanitis   Nocturia    1-2 years is when it has gotten slightly worse, now 2-3 times per night   Has changed some of his fluid intake in the evening, has helped   Only tried the last few weeks   Feels he is being awakened by the urge   More tired during the day, but feels this due to age   Not been told he snores a lot  No history of being worried about obstructive sleep apnea   No real bothersome daytime symptoms  Some baseline urinary frequency, not bothersome    Objective     PHYSICAL EXAM  General: Alert, oriented, no acute distress    LABORATORY  No history of urinalysis    Lab Results   Component Value Date/Time    PSA 3.77 09/19/2023 05:15 PM     Assessment & Plan   BPH with nocturia  Foreskin adhesions    It was my pleasure to meet with Mr. Sousa  "in the office today in regards to his chronic penile/foreskin adhesions that have been getting slightly worse over the last 15 years as well as his nocturia.  After reviewing his clinical history, I am first happy to hear that neither of these problems has caused a significant change or hindrance to his quality of life.  In regards to his adhesions, we discussed the mechanism of how these form, and that at this point he has 3 options for management: Observation, steroid usage, or circumcision.  We discussed each of these options in detail and ultimately Edin is interested in trialing clobetasol 0.05% twice daily for 1.5 to 2 weeks at a time with a week or 2 break in between.  We discussed the proper way to use this and he expressed understanding and agreement.    In regards to his nocturia, we discussed that overall I am glad to hear this is not incredibly bothersome and that what we do is based on his level of bother at this point.  We discussed different lifestyle changes including avoidance of bladder irritants and restricting fluids in the evening, a trial of tamsulosin, or more invasive testing.  At this time, he would like to take a \"less is more\" approach which is very reasonable and we will mainly focus on lifestyle.    Based on the above discussions, we need no dedicated follow-up but rather I will wait to hear from him over MyChart if he has further concerns. Mr. Sousa expressed understanding and agreement to the above discussion and plan and all of his questions were answered to his satisfaction.     PLAN  Trial of clobetasol 0.05% twice daily for foreskin adhesions  Lifestyle modifications for nocturia  Follow-up with urology as needed    Signed by:    Conor Monet PA-C  "

## 2024-09-03 NOTE — TELEPHONE ENCOUNTER
Action .2024 JTV 8:03 AM    Action Taken CSS called patient. Patient confirmed CT done 15 years ago. No labs. No imaging.    MEDICAL RECORDS REQUEST   [facility]  Urology Clinic  [address] [place] [id number]  PHONE: [phone]  Fax: [phone]        FUTURE VISIT INFORMATION                                                   Edin Sousa, : 1950 scheduled for future visit at [facility]    APPOINTMENT INFORMATION:  Date: 2024  Provider:  Conor Monet PA-C  Reason for Visit/Diagnosis: Enlarged Prostate, Nocturia, Foreskin Adhesions    REFERRAL INFORMATION:  Referring provider:  Argentina Brown MD in [facility]      RECORDS REQUESTED FOR VISIT                                                     NOTES  STATUS/DETAILS   OFFICE NOTE from referring provider  yes, 2024 -- Argentina Brown MD in [facility]   OFFICE NOTE from other specialist  yes   MEDICATION LIST  yes   LABS     PSA  yes   IMAGES  no     PRE-VISIT CHECKLIST      Joint diagnostic appointment coordinated correctly          (ensure right order & amount of time) Yes   RECORD COLLECTION COMPLETE yes

## 2024-09-04 ENCOUNTER — PRE VISIT (OUTPATIENT)
Dept: UROLOGY | Facility: CLINIC | Age: 74
End: 2024-09-04

## 2024-09-04 ENCOUNTER — OFFICE VISIT (OUTPATIENT)
Dept: UROLOGY | Facility: CLINIC | Age: 74
End: 2024-09-04
Payer: COMMERCIAL

## 2024-09-04 VITALS
BODY MASS INDEX: 26.66 KG/M2 | HEIGHT: 69 IN | DIASTOLIC BLOOD PRESSURE: 79 MMHG | OXYGEN SATURATION: 99 % | SYSTOLIC BLOOD PRESSURE: 109 MMHG | WEIGHT: 180 LBS | HEART RATE: 85 BPM

## 2024-09-04 DIAGNOSIS — N47.5 FORESKIN ADHESIONS: Primary | ICD-10-CM

## 2024-09-04 DIAGNOSIS — N40.0 ENLARGED PROSTATE: ICD-10-CM

## 2024-09-04 DIAGNOSIS — R35.1 NOCTURIA: ICD-10-CM

## 2024-09-04 PROCEDURE — 99204 OFFICE O/P NEW MOD 45 MIN: CPT | Performed by: STUDENT IN AN ORGANIZED HEALTH CARE EDUCATION/TRAINING PROGRAM

## 2024-09-04 RX ORDER — CLOBETASOL PROPIONATE 0.5 MG/G
OINTMENT TOPICAL 2 TIMES DAILY
Qty: 30 G | Refills: 1 | Status: SHIPPED | OUTPATIENT
Start: 2024-09-04

## 2024-09-04 ASSESSMENT — PAIN SCALES - GENERAL: PAINLEVEL: NO PAIN (0)

## 2024-09-04 NOTE — LETTER
9/4/2024       RE: Edin Sousa  [address] [place] [address]     Dear Colleague,    Thank you for referring your patient, Edin Sousa, to the [facility]. Please see a copy of my visit note below.    Subjective    REFERRING PROVIDER  Argentina Brown MD    REASON FOR VISIT  Nocturia and foreskin adhesions    HISTORY OF PRESENT ILLNESS  Mr. Sousa is a pleasant 74 year old male with a past medical history significant for chronic constipation, postpolio syndrome, prediabetes, tobacco use, and eczema who presents today for discussion of his bothersome nocturia and foreskin adhesions.  I personally reviewed the family practice visit note from 6/14/2024 in preparation for today's visit.    According to that note, Mr. Sousa endorsed bothersome nocturia with no other bothersome urinary symptoms in the setting of an enlarged prostate seen on CT scan many years ago as well as difficulty retracting his foreskin that has been getting worse over many years.  Did not endorse any recurrent infections at that time.  Was referred to urology for discussion of these findings.    Today:  Phimosis  Foreskin adhesions present for 15 years, getting worse   No pain at baseline, but pain with retraction and tension   No previous intervention  Adhesions are worse int he morning, but improve throughout the day, but can never get the foreskin past the back of the glans  No history of balanitis   Nocturia    1-2 years is when it has gotten slightly worse, now 2-3 times per night   Has changed some of his fluid intake in the evening, has helped   Only tried the last few weeks   Feels he is being awakened by the urge   More tired during the day, but feels this due to age   Not been told he snores a lot  No history of being worried about obstructive sleep apnea   No real bothersome daytime symptoms  Some baseline urinary frequency, not  "bothersome    Objective    PHYSICAL EXAM  General: Alert, oriented, no acute distress    LABORATORY  No history of urinalysis    Lab Results   Component Value Date/Time    PSA 3.77 09/19/2023 05:15 PM     Assessment & Plan  BPH with nocturia  Foreskin adhesions    It was my pleasure to meet with Mr. Sousa in the office today in regards to his chronic penile/foreskin adhesions that have been getting slightly worse over the last 15 years as well as his nocturia.  After reviewing his clinical history, I am first happy to hear that neither of these problems has caused a significant change or hindrance to his quality of life.  In regards to his adhesions, we discussed the mechanism of how these form, and that at this point he has 3 options for management: Observation, steroid usage, or circumcision.  We discussed each of these options in detail and ultimately Edin is interested in trialing clobetasol 0.05% twice daily for 1.5 to 2 weeks at a time with a week or 2 break in between.  We discussed the proper way to use this and he expressed understanding and agreement.    In regards to his nocturia, we discussed that overall I am glad to hear this is not incredibly bothersome and that what we do is based on his level of bother at this point.  We discussed different lifestyle changes including avoidance of bladder irritants and restricting fluids in the evening, a trial of tamsulosin, or more invasive testing.  At this time, he would like to take a \"less is more\" approach which is very reasonable and we will mainly focus on lifestyle.    Based on the above discussions, we need no dedicated follow-up but rather I will wait to hear from him over MyChart if he has further concerns. Mr. Sousa expressed understanding and agreement to the above discussion and plan and all of his questions were answered to his satisfaction.     PLAN  Trial of clobetasol 0.05% twice daily for foreskin adhesions  Lifestyle modifications for " nocturia  Follow-up with urology as needed    Signed by:    Conor Monet PA-C      Again, thank you for allowing me to participate in the care of your patient.      Sincerely,    Conor Monet PA-C

## 2024-09-04 NOTE — NURSING NOTE
"Chief Complaint   Patient presents with    Consult     Pt is here for foreskin adhesions     Pt has pain kevin trying to pull the foreskin back. Becomes swollen or inflamed when pulled, and on rare occasions can result in bleeding. Onset was about 15 years ago. Pt states that it wont pull back. Was difficult to pull back earlier closer to the onset, but is worse now. Pt states that it doesn't happen all the time but he can have circumferential cuts around foreskin, on both inside and outside. Is very tender to the touch. No pain associated with touching it normally but if he tried to pull the foreskin back a lot that's when the pain ensues.     Blood pressure 109/79, pulse 85, height 1.74 m (5' 8.5\"), weight 81.6 kg (180 lb), SpO2 99%. Body mass index is 26.97 kg/m .    Patient Active Problem List   Diagnosis    Prediabetes    Postpoliomyelitis syndrome (H28)    Lipoma    Tobacco use    Chronic constipation    Diverticular disease of colon    History of adenomatous polyp of colon    Dysphagia, unspecified type    Muscle cramps    Muscle weakness (generalized)    Left foot pain    Right knee pain, unspecified chronicity    Right foot pain    Callus of foot    Enlarged prostate    Nocturia    Foreskin adhesions    Tinnitus, bilateral    History of eczema    Vitamin B12 deficiency (non anemic)    Sensorineural hearing loss, bilateral    Hyperacusis of both ears       No Known Allergies    Current Outpatient Medications   Medication Sig Dispense Refill    Cholecalciferol (VITAMIN D-3 PO)       clobetasol (TEMOVATE) 0.05 % external ointment Apply topically daily as needed      cyanocobalamin (CYANOCOBALAMIN) 1000 MCG/ML injection 1000 mcg IM daily 1 week, then 1 per week for 4 weeks, then 1 per month for 3 months = total 14 ml , then recheck B 12 level 14 mL 0    Folic Acid (FOLATE PO)       insulin syringe-needle U-100 (31G X 5/16\" 1 ML) 31G X 5/16\" 1 ML miscellaneous Use needles and syringes as directed with vitamin B12 " injections 20 each 0    Isopropyl Alcohol (ALCOHOL WIPES) 70 % MISC Externally apply 1 packet topically as needed (for skin prep prior to each vitamin B12 injection) 40 each 0       Social History     Tobacco Use    Smoking status: Former     Current packs/day: 0.00     Average packs/day: 0.3 packs/day for 9.0 years (2.3 ttl pk-yrs)     Types: Cigarettes     Start date: 1971     Quit date: 1980     Years since quittin.7     Passive exposure: Past    Smokeless tobacco: Never    Tobacco comments:     Social 1 pack a week , many yrs without , social 1 to 2 yrs, then none 5 yrs  & so on till thinks quit completely in    Vaping Use    Vaping status: Never Used   Substance Use Topics    Alcohol use: Yes     Comment: 1 glass of wine almost 5 days / week at dinner, 8 to 10 oz,    Drug use: Never       Aaron Blunt  2024  9:02 AM

## 2024-09-18 ENCOUNTER — THERAPY VISIT (OUTPATIENT)
Dept: PHYSICAL THERAPY | Facility: CLINIC | Age: 74
End: 2024-09-18
Payer: COMMERCIAL

## 2024-09-18 DIAGNOSIS — K59.09 CHRONIC CONSTIPATION: Primary | ICD-10-CM

## 2024-09-18 PROCEDURE — 97530 THERAPEUTIC ACTIVITIES: CPT | Mod: GP

## 2024-09-18 PROCEDURE — 97535 SELF CARE MNGMENT TRAINING: CPT | Mod: GP

## 2024-09-24 ENCOUNTER — LAB (OUTPATIENT)
Dept: LAB | Facility: CLINIC | Age: 74
End: 2024-09-24
Payer: COMMERCIAL

## 2024-09-24 DIAGNOSIS — E53.8 VITAMIN B12 DEFICIENCY (NON ANEMIC): ICD-10-CM

## 2024-09-24 LAB — VIT B12 SERPL-MCNC: 511 PG/ML (ref 232–1245)

## 2024-09-24 PROCEDURE — 82607 VITAMIN B-12: CPT

## 2024-09-24 PROCEDURE — 36415 COLL VENOUS BLD VENIPUNCTURE: CPT

## 2024-09-25 NOTE — RESULT ENCOUNTER NOTE
Hello -    Here are my comments about your recent results:  Vit B 12 is improved  May stop injections and take subllingual vitamin B 12 2500 mcg daily OTC and recheck in 1 month of this  Please also make an inperson apt to see me for a medicare wellness as you are over due and we need to catch up on preventive care and follow up from the one time i saw you in June     Please let us know if you have any questions or concerns.     Regards,  Argentina Brown MD

## 2024-10-26 ENCOUNTER — HEALTH MAINTENANCE LETTER (OUTPATIENT)
Age: 74
End: 2024-10-26

## 2024-12-30 ENCOUNTER — OFFICE VISIT (OUTPATIENT)
Dept: FAMILY MEDICINE | Facility: CLINIC | Age: 74
End: 2024-12-30
Payer: COMMERCIAL

## 2024-12-30 VITALS
SYSTOLIC BLOOD PRESSURE: 124 MMHG | DIASTOLIC BLOOD PRESSURE: 70 MMHG | RESPIRATION RATE: 22 BRPM | WEIGHT: 187.4 LBS | HEIGHT: 68 IN | HEART RATE: 76 BPM | TEMPERATURE: 97.4 F | BODY MASS INDEX: 28.4 KG/M2 | OXYGEN SATURATION: 97 %

## 2024-12-30 DIAGNOSIS — Z13.6 ENCOUNTER FOR LIPID SCREENING FOR CARDIOVASCULAR DISEASE: ICD-10-CM

## 2024-12-30 DIAGNOSIS — N47.5 FORESKIN ADHESIONS: ICD-10-CM

## 2024-12-30 DIAGNOSIS — M79.671 RIGHT FOOT PAIN: ICD-10-CM

## 2024-12-30 DIAGNOSIS — R79.9 ABNORMAL FINDING OF BLOOD CHEMISTRY, UNSPECIFIED: ICD-10-CM

## 2024-12-30 DIAGNOSIS — R73.03 PREDIABETES: ICD-10-CM

## 2024-12-30 DIAGNOSIS — M25.561 RIGHT KNEE PAIN, UNSPECIFIED CHRONICITY: ICD-10-CM

## 2024-12-30 DIAGNOSIS — Z13.220 ENCOUNTER FOR LIPID SCREENING FOR CARDIOVASCULAR DISEASE: ICD-10-CM

## 2024-12-30 DIAGNOSIS — H93.13 TINNITUS, BILATERAL: ICD-10-CM

## 2024-12-30 DIAGNOSIS — D17.9 LIPOMA, UNSPECIFIED SITE: ICD-10-CM

## 2024-12-30 DIAGNOSIS — Z12.5 SCREENING FOR PROSTATE CANCER: ICD-10-CM

## 2024-12-30 DIAGNOSIS — K59.09 CHRONIC CONSTIPATION: ICD-10-CM

## 2024-12-30 DIAGNOSIS — N40.1 BENIGN PROSTATIC HYPERPLASIA WITH NOCTURIA: ICD-10-CM

## 2024-12-30 DIAGNOSIS — Z23 NEED FOR COVID-19 VACCINE: ICD-10-CM

## 2024-12-30 DIAGNOSIS — H90.3 SENSORINEURAL HEARING LOSS, BILATERAL: ICD-10-CM

## 2024-12-30 DIAGNOSIS — H93.233 HYPERACUSIS OF BOTH EARS: ICD-10-CM

## 2024-12-30 DIAGNOSIS — M79.672 LEFT FOOT PAIN: ICD-10-CM

## 2024-12-30 DIAGNOSIS — G14 POSTPOLIOMYELITIS SYNDROME (H): ICD-10-CM

## 2024-12-30 DIAGNOSIS — Z12.11 SCREEN FOR COLON CANCER: ICD-10-CM

## 2024-12-30 DIAGNOSIS — Z87.2 HISTORY OF ECZEMA: ICD-10-CM

## 2024-12-30 DIAGNOSIS — R13.10 PILL DYSPHAGIA: ICD-10-CM

## 2024-12-30 DIAGNOSIS — Z23 NEED FOR INFLUENZA VACCINATION: ICD-10-CM

## 2024-12-30 DIAGNOSIS — Z23 NEED FOR SHINGLES VACCINE: ICD-10-CM

## 2024-12-30 DIAGNOSIS — Z86.0101 HISTORY OF ADENOMATOUS POLYP OF COLON: ICD-10-CM

## 2024-12-30 DIAGNOSIS — R25.2 MUSCLE CRAMPS: ICD-10-CM

## 2024-12-30 DIAGNOSIS — E53.8 VITAMIN B12 DEFICIENCY (NON ANEMIC): ICD-10-CM

## 2024-12-30 DIAGNOSIS — R35.1 BENIGN PROSTATIC HYPERPLASIA WITH NOCTURIA: ICD-10-CM

## 2024-12-30 DIAGNOSIS — K57.30 DIVERTICULAR DISEASE OF COLON: ICD-10-CM

## 2024-12-30 DIAGNOSIS — Z00.00 HEALTH CARE MAINTENANCE: ICD-10-CM

## 2024-12-30 DIAGNOSIS — Z00.00 ENCOUNTER FOR MEDICARE ANNUAL WELLNESS EXAM: Primary | ICD-10-CM

## 2024-12-30 DIAGNOSIS — M62.81 MUSCLE WEAKNESS (GENERALIZED): ICD-10-CM

## 2024-12-30 DIAGNOSIS — L84 CALLUS OF FOOT: ICD-10-CM

## 2024-12-30 DIAGNOSIS — Z71.89 ADVANCED DIRECTIVES, COUNSELING/DISCUSSION: ICD-10-CM

## 2024-12-30 PROBLEM — Z72.0 TOBACCO USE: Status: RESOLVED | Noted: 2018-02-16 | Resolved: 2024-12-30

## 2024-12-30 PROBLEM — N40.0 ENLARGED PROSTATE: Status: RESOLVED | Noted: 2024-06-14 | Resolved: 2024-12-30

## 2024-12-30 LAB
ALBUMIN SERPL BCG-MCNC: 4.2 G/DL (ref 3.5–5.2)
ALP SERPL-CCNC: 54 U/L (ref 40–150)
ALT SERPL W P-5'-P-CCNC: 17 U/L (ref 0–70)
ANION GAP SERPL CALCULATED.3IONS-SCNC: 10 MMOL/L (ref 7–15)
AST SERPL W P-5'-P-CCNC: 21 U/L (ref 0–45)
BASOPHILS # BLD AUTO: 0 10E3/UL (ref 0–0.2)
BASOPHILS NFR BLD AUTO: 1 %
BILIRUB SERPL-MCNC: 0.2 MG/DL
BUN SERPL-MCNC: 12.9 MG/DL (ref 8–23)
CALCIUM SERPL-MCNC: 9.6 MG/DL (ref 8.8–10.4)
CHLORIDE SERPL-SCNC: 103 MMOL/L (ref 98–107)
CHOLEST SERPL-MCNC: 175 MG/DL
CREAT SERPL-MCNC: 0.84 MG/DL (ref 0.67–1.17)
CREAT UR-MCNC: 96.2 MG/DL
EGFRCR SERPLBLD CKD-EPI 2021: >90 ML/MIN/1.73M2
EOSINOPHIL # BLD AUTO: 0.3 10E3/UL (ref 0–0.7)
EOSINOPHIL NFR BLD AUTO: 5 %
ERYTHROCYTE [DISTWIDTH] IN BLOOD BY AUTOMATED COUNT: 13.2 % (ref 10–15)
EST. AVERAGE GLUCOSE BLD GHB EST-MCNC: 123 MG/DL
FASTING STATUS PATIENT QL REPORTED: NO
FASTING STATUS PATIENT QL REPORTED: NO
FERRITIN SERPL-MCNC: 121 NG/ML (ref 31–409)
FOLATE SERPL-MCNC: 14.8 NG/ML (ref 4.6–34.8)
GLUCOSE SERPL-MCNC: 97 MG/DL (ref 70–99)
HBA1C MFR BLD: 5.9 % (ref 0–5.6)
HCO3 SERPL-SCNC: 26 MMOL/L (ref 22–29)
HCT VFR BLD AUTO: 42.5 % (ref 40–53)
HDLC SERPL-MCNC: 30 MG/DL
HGB BLD-MCNC: 13.9 G/DL (ref 13.3–17.7)
IMM GRANULOCYTES # BLD: 0 10E3/UL
IMM GRANULOCYTES NFR BLD: 0 %
LDLC SERPL CALC-MCNC: 101 MG/DL
LYMPHOCYTES # BLD AUTO: 1.7 10E3/UL (ref 0.8–5.3)
LYMPHOCYTES NFR BLD AUTO: 29 %
MCH RBC QN AUTO: 29.6 PG (ref 26.5–33)
MCHC RBC AUTO-ENTMCNC: 32.7 G/DL (ref 31.5–36.5)
MCV RBC AUTO: 91 FL (ref 78–100)
MICROALBUMIN UR-MCNC: 14.6 MG/L
MICROALBUMIN/CREAT UR: 15.18 MG/G CR (ref 0–17)
MONOCYTES # BLD AUTO: 0.5 10E3/UL (ref 0–1.3)
MONOCYTES NFR BLD AUTO: 9 %
NEUTROPHILS # BLD AUTO: 3.3 10E3/UL (ref 1.6–8.3)
NEUTROPHILS NFR BLD AUTO: 56 %
NONHDLC SERPL-MCNC: 145 MG/DL
PLATELET # BLD AUTO: 248 10E3/UL (ref 150–450)
POTASSIUM SERPL-SCNC: 4.5 MMOL/L (ref 3.4–5.3)
PROT SERPL-MCNC: 7 G/DL (ref 6.4–8.3)
PSA SERPL DL<=0.01 NG/ML-MCNC: 3.38 NG/ML (ref 0–6.5)
RBC # BLD AUTO: 4.69 10E6/UL (ref 4.4–5.9)
SODIUM SERPL-SCNC: 139 MMOL/L (ref 135–145)
TRIGL SERPL-MCNC: 218 MG/DL
TSH SERPL DL<=0.005 MIU/L-ACNC: 4.06 UIU/ML (ref 0.3–4.2)
VIT B12 SERPL-MCNC: 517 PG/ML (ref 232–1245)
WBC # BLD AUTO: 5.8 10E3/UL (ref 4–11)

## 2024-12-30 PROCEDURE — G0103 PSA SCREENING: HCPCS | Performed by: FAMILY MEDICINE

## 2024-12-30 PROCEDURE — 80053 COMPREHEN METABOLIC PANEL: CPT | Performed by: FAMILY MEDICINE

## 2024-12-30 PROCEDURE — 82728 ASSAY OF FERRITIN: CPT | Performed by: FAMILY MEDICINE

## 2024-12-30 PROCEDURE — 90480 ADMN SARSCOV2 VAC 1/ONLY CMP: CPT | Performed by: FAMILY MEDICINE

## 2024-12-30 PROCEDURE — 82043 UR ALBUMIN QUANTITATIVE: CPT | Performed by: FAMILY MEDICINE

## 2024-12-30 PROCEDURE — 85025 COMPLETE CBC W/AUTO DIFF WBC: CPT | Performed by: FAMILY MEDICINE

## 2024-12-30 PROCEDURE — G0439 PPPS, SUBSEQ VISIT: HCPCS | Performed by: FAMILY MEDICINE

## 2024-12-30 PROCEDURE — 82570 ASSAY OF URINE CREATININE: CPT | Performed by: FAMILY MEDICINE

## 2024-12-30 PROCEDURE — 82607 VITAMIN B-12: CPT | Performed by: FAMILY MEDICINE

## 2024-12-30 PROCEDURE — 36415 COLL VENOUS BLD VENIPUNCTURE: CPT | Performed by: FAMILY MEDICINE

## 2024-12-30 PROCEDURE — 83036 HEMOGLOBIN GLYCOSYLATED A1C: CPT | Performed by: FAMILY MEDICINE

## 2024-12-30 PROCEDURE — 84443 ASSAY THYROID STIM HORMONE: CPT | Performed by: FAMILY MEDICINE

## 2024-12-30 PROCEDURE — 99214 OFFICE O/P EST MOD 30 MIN: CPT | Mod: 25 | Performed by: FAMILY MEDICINE

## 2024-12-30 PROCEDURE — 91320 SARSCV2 VAC 30MCG TRS-SUC IM: CPT | Performed by: FAMILY MEDICINE

## 2024-12-30 PROCEDURE — 82746 ASSAY OF FOLIC ACID SERUM: CPT | Performed by: FAMILY MEDICINE

## 2024-12-30 PROCEDURE — 80061 LIPID PANEL: CPT | Performed by: FAMILY MEDICINE

## 2024-12-30 RX ORDER — LANOLIN ALCOHOL/MO/W.PET/CERES
1000 CREAM (GRAM) TOPICAL DAILY
COMMUNITY

## 2024-12-30 RX ORDER — CYANOCOBALAMIN (VITAMIN B-12) 2500 MCG
2500 TABLET, SUBLINGUAL SUBLINGUAL DAILY
COMMUNITY
End: 2024-12-30

## 2024-12-30 SDOH — HEALTH STABILITY: PHYSICAL HEALTH: ON AVERAGE, HOW MANY DAYS PER WEEK DO YOU ENGAGE IN MODERATE TO STRENUOUS EXERCISE (LIKE A BRISK WALK)?: 7 DAYS

## 2024-12-30 SDOH — HEALTH STABILITY: PHYSICAL HEALTH: ON AVERAGE, HOW MANY MINUTES DO YOU ENGAGE IN EXERCISE AT THIS LEVEL?: 40 MIN

## 2024-12-30 ASSESSMENT — PAIN SCALES - GENERAL: PAINLEVEL_OUTOF10: MODERATE PAIN (4)

## 2024-12-30 ASSESSMENT — SOCIAL DETERMINANTS OF HEALTH (SDOH): HOW OFTEN DO YOU GET TOGETHER WITH FRIENDS OR RELATIVES?: MORE THAN THREE TIMES A WEEK

## 2024-12-30 NOTE — PROGRESS NOTES
The below note was dictated using voice recognition. Although reviewed after completion, some word and grammatical error may remain .        Preventive Care Visit  [facility]  Argentina Brown MD, Family Medicine  Dec 30, 2024      Assessment & Plan     Encounter for Medicare annual wellness exam  Seen for medicare wellness & follow up  Seen once in June 2024.  Health care maintenance reviewed  Unclear if has health care directives at home, given honoring choices to review  Vitals stable   Vision feels getting worse, wears glasses, has early cataracts, to see eye.  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  Former smoker: May have started in his 20's smoked socially less than 1 pack a week off-and-on many years thinks may have quit completely by 1980, quit > 40 yrs ago.  Did play pool in a bar that may have exposed him to passive smoke for many years after that, but currently does not meet criteria for CT lung cancer screening  Vaccines reviewed  Declines flu vaccine today  Given COVID vaccine  Encouraged to consider Sh[employer] at his pharmacy  Will do non fasting labs today & make further recommendations once reviewed     Vit B12 def dx in June 2024, not anemic, was < 150, replaced with injectable daily vit B 12 1000 mcg 1 wk, then wkly 1 month then monthly 3 months & then. In sept 2024 noted was improved to 511. Advised to stop injection & switch to vit B sl 2500 mcg & recheck in 1 month. Here 3 months later. Notes been taking 1000 mcg B 12 orally instead since last shot. Also on  folate unknown dose & vit d . Will check values today. He reports no change in any symptoms on taking vit B 12 but noted gi symptoms & muscle cramps better today& subjective generalized weakness not any worse. Offered could check for pernicious anemia but would not  & opted not to do those additional tests today.  Labs came back with a normal Vitamin B 12, to continue with 1000 mcg of vitamin B 12 as  doing. Had a normal iron, ferritin, folate & CBC.     History of prediabetes.  Hemoglobin A1c at physical in September 2023 was 6.2  & in 6/2024 was 6.1. was encouraged low carbohydrate intake, smaller portions of wheat, pasta, gluten, rice potato and more Mediterranean style diet avoid eating 3 hours before bedtime and work on some weight loss to help prevent diabetes.will check today.   HBA1C came back at  5.9 remains in the prediabetic range.  Improved slightly from before.  Encouraged to lower carbohydrate intake some weight loss and recheck in 6 months to 1 year. Microalbumin  was normal. While LDL is 101, triglycerides are elevated at 218 & HDL is low  & the overall cardiovascular risk is high, The 10-year ASCVD risk score (Maryjo RODRÍGUEZ, et al., 2019) is: 23 to 30% of having a risk of a heart attack or stroke in the next 10 years. Given these values recommendation is to start a statin medication to lower cardiovascular risk if agreeable I can send this in. If desiring more objective data for coronary arteries we could do a CT calcium score to assess calcification of coronary arteries if this is positive recommendation would be aggressive medical risk factor modification with statins excetra. Had a normal CMP & TSH.     History of chronic constipation his whole life ( hard stools, difficulty evacuating bowels &  sense of feeling of incomplete evacuation). He had no rectal symptoms & reported no use of enemas. Reported a CT abdomen done 15 years prior for sharp abdominal pain of unclear etiology  had  been unremarkable Colonoscopy done in May 2024 at [place] [facility] showed mild proctitis felt due to the bowel prep and 11 polyps removed several of them tubular adenoma, & serrated adenoma & advised recheck in 1 year.  Had had 2 prior colonoscopies more than 15 years prior that he reported did not suggest etiology for constipation.  Did have diverticulosis likely a result of chronic constipation and encouraged a high-fiber diet. Was advised increasing water intake to 6 to 8 glasses a day. Increasing fiber in diet and staying active & trying MiraLAX 1 tablespoon in 8 ounces liquid daily may titrate up and down to achieve a bowel movement daily due to possibility ( had never taken before). Due to possible pelvic floor dysfunction ( history of polio)  that may be affecting evacuation of bowels was referred for pelvic therapy & due to duration of symptoms referred to GI for a formal consult. Was also advised  a trial of avoiding all dairy for 2 weeks see if that made a difference and if not, then to avoid gluten products for 2 weeks to see if made a difference. Could also consider low FODMAPs diet if needed in the future.  In 6/2024 noted a normal cbc, CMP, celiac, TSH, magnesium, ferritin, iron, HBA1C was 6.1. B 12 < 150 & advised 1000 mcg IM daily 1 week then weekly 1 month then monthly 3 months then recheck & switch to oral & recheck on oral to see if sufficient. Seen by GI 7/21/24 & advised miralax & consider speech Rx for pill dysphagia, seen by pelvic therapy 8/9 & 8/29 & noted miralax & exercises given helping. B 12 normal 9/2024 & been on oral B 12 since. Feels better since did pelvic therapy & on miralax. Not sure if tried any elimination diets.      Pill dysphagia. But no swallowing issues with different textures of food or liquids & no choking with liquids.  Reports no heartburn or reflux.  No red flag symptoms or signs. Met with GI 7/2024 & opted no speech therapy offered.      Prior intermittent sharp  muscle cramps that occurred specific to one area right anterior abdomen & sometimes on the left with certain postures like with bending over have resolved. Not sure if B 12 helped but felt was  musculoskeletal in nature. 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 Due to report of worsening subjective weakness & pill dysphagia was referred to neurology but opted not to see them. No sig change & not worse since last seen. No falls or tremor,  no ascending weakness. No double vision. No incontinence of bladder or bowels.  Vision is getting worse but does have early cataracts.      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 The rest of his left foot had been compensating for this all along and now that compensation has become painful on the outer lateral edge of his left foot. He has been going to the gym to use the machine to do dorsi and plantarflexion of the foot to strengthen his left calf muscle.     Right knee pain. Seen by [facility] orthopedics. X-ray showed loss of cartilage at the patella and was assessed to have possible arthritis.  He has no locking but it may feel like buckling if he is not careful.  There is no swelling or redness or warmth.  He was referred and did physical therapy at an associated PT group to strengthen the quads of the right knee. He felt he got more information from youtube & strengthening his glutes helped knee.     Right foot pain mostly along the outer edge. He had a history of a  prior fifth toe fracture > 35 years ago. He did go see [facility] orthopedics x-rays done showed the old fracture but no new fracture. Had a corn that was pared down.  He felt pain under the pad of foot made it difficult to walk.  He usually wore flexible tennis shoes as felt stiff soled shoes made it hurt more. He did tend to walk barefoot in the house. He was referred by [facility] for orthotics and was planning to get that for his feet & see the podiatrist downstairs for more help. Not sure if did that yet.  When seen in June had been advised symptoms could be due to plantar fascitis & to avoid walking barefoot and use stiff soled shoes if possible &  try rolling feet on a frozen bottle of water 10 minutes daily to see if that helped. Not sure if tried any of that as he feels symptoms are due to his corns & notes is to see podiatry for hs feet,    Lateral edge left foot pain on the side where had reconstructive Achilles tendon repair at age 11 for polio related leg weakness involving a graft from the left big toe with residual big toe joint fusion.  This had affected mechanics of weightbearing on that side many year. There was no tingling or numbness or skin breakdown rash or swelling.  It was affecting his ability to exercise which was essential for him for his digestion as well as his physical and mental wellbeing.  He reported x-rays at [facility] orthopedics showed no arthritis or fractures.  In June I thought he probably had some plantar fasciitis or tendinopathy.  He had reported was going to start wearing orthotics given by [facility] & see  podiatry but not sure if did this and notes will be seeing podiatry.     Had a callus on the bottom of the B/l fifth toe ball of foot area likely due to poor mechanics and weightbearing and [facility] gave him orthotics that could relieve that pressure. But not sure if using. To see podiatry.      BPH + nocturia. History of enlarged prostate noted on a CT abdomen done many years ago for sharp pain that  resolved of unknown etiology.  He reported no dysuria or hematuria no frequency urgency or hesitancy  or incontinence but did have nocturia once to twice at night that was not bothersome, likely related to the prostate. Seen by urology in 9/2024 for BPH with nocturia & discussed options & to do lifestyle management. Not drinking fluids after 7 pm has been helping.   PSA came back normal.     Also reported  progressive difficulty retracting the foreskin over many years.  Reported no zipper injury or trauma or tight under clothing, reported no rash or swelling or ballooning of the foreskin or recurrent UTI's or emptying the bladder.  Referred to the urologist to further evaluate and treat. Seen by them 8/2024 & Given a trial of clobetasol for foreskin adhesions & advised to return to urology for surgical option if that failed. Notes trial of clobetasol made no difference. Encouraged to return to urology for surgical options if feels bothered by symptoms.      SNHL/ B/l tinnitus/ hyperacusis: Seen by audiology 7/15/24. Feels doing ok without hearing aids.       History of eczema, previously had red itchy patches on hip, leg, finger, and back, triamcinolone was not very helpful then seen by [facility] and given clobetasol 0.05% oint with resolution of symptoms. No current rash. Advised to use rice grain amount of clobetasol sparingly only to red itchy areas as it is a potent steroid and not to apply for more than 2 weeks in a row to prevent tachyphylaxis, skin thinning and poor healing etcetera.    Sees derm consultants. Had a ? AK lesion right face that was cryoed few weeks ago. Feels unchanged & will return to derm for it.      Return in 1 yr for a physical & sooner for any concerns.   - COVID-19 12+ (PFIZER)  - PRIMARY CARE FOLLOW-UP SCHEDULING; Future  - Lipid panel reflex to direct LDL Non-fasting; Future  - Colonoscopy Screening  Referral; Future  - Lipid panel reflex to direct LDL  Non-fasting    Vitamin B 12 deficiency (non anemic)  Vit B 12 def dx in June 2024, not anemic, was < 150, replaced with injectable daily vit B 12 1000 mcg 1 wk, then wkly 1 month then monthly 3 months & then. In sept 2024 noted was improved to 511. Advised to stop injection & switch to vit B sl 2500 mcg & recheck in 1 month. Here 3 months later. Notes been taking 1000 mcg B 12 orally instead since last shot. Also on folate unknown dose & vit d . Will check values today. He reports no change in any symptoms on taking vit B 12 but noted gi symptoms & muscle cramps better today& subjective generalized weakness not any worse. Offered could check for pernicious anemia but would not  & opted not to do those additional tests today.  Labs came back with a normal Vitamin B 12, to continue with 1000 mcg of vitamin B 12 as  doing. Had a normal iron, ferritin, folate & CBC.   - CBC with Platelets & Differential; Future  - Vitamin B 12; Future  - Ferritin; Future  - Folate; Future  - CBC with Platelets & Differential  - Vitamin B 12  - Ferritin  - Folate    Prediabetes  History of prediabetes.  Hemoglobin A1c at physical in September 2023 was 6.2  & in 6/2024 was 6.1. was encouraged low carbohydrate intake, smaller portions of wheat, pasta, gluten, rice potato and more Mediterranean style diet avoid eating 3 hours before bedtime and work on some weight loss to help prevent diabetes.will check today.   HBA1C came back at  5.9 remains in the prediabetic range.  Improved slightly from before.  Encouraged to lower carbohydrate intake some weight loss and recheck in 6 months to 1 year. Microalbumin  was normal. While LDL is 101, triglycerides are elevated at 218 & HDL is low  & the overall cardiovascular risk is high, The 10-year ASCVD risk score (Americus DK, et al., 2019) is: 23 to 30% of having a risk of a heart attack or stroke in the next 10 years. Given these values recommendation is to start a statin medication  to lower cardiovascular risk if agreeable I can send this in. If desiring more objective data for coronary arteries we could do a CT calcium score to assess calcification of coronary arteries if this is positive recommendation would be aggressive medical risk factor modification with statins excetra. Had a normal CMP & TSH.   - Comprehensive metabolic panel; Future  - Albumin Random Urine Quantitative with Creat Ratio; Future  - Hemoglobin A1c; Future  - Comprehensive metabolic panel  - Albumin Random Urine Quantitative with Creat Ratio  - Hemoglobin A1c    Chronic constipation- TSH with free T4 reflex; Future  Diverticular disease of colon  History of chronic constipation his whole life ( hard stools, difficulty evacuating bowels &  sense of feeling of incomplete evacuation). He had no rectal symptoms & reported no use of enemas. Reported a CT abdomen done 15 years prior for sharp abdominal pain of unclear etiology  had been unremarkable Colonoscopy done in May 2024 at [place] [facility] showed mild proctitis felt due to the bowel prep and 11 polyps removed several of them tubular adenoma, & serrated adenoma & advised recheck in 1 year.  Had had 2 prior colonoscopies more than 15 years prior that he reported did not suggest etiology for constipation.  Did have diverticulosis likely a result of chronic constipation and encouraged a high-fiber diet. Was advised increasing water intake to 6 to 8 glasses a day. Increasing fiber in diet and staying active & trying MiraLAX 1 tablespoon in 8 ounces liquid daily may titrate up and down to achieve a bowel movement daily due to possibility ( had never taken before). Due to possible pelvic floor dysfunction ( history of polio)  that may be affecting evacuation of bowels was referred for pelvic therapy & due to duration of symptoms referred to GI for a formal consult. Was also advised  a trial of avoiding all dairy for 2 weeks see if that made a difference and if not, then to  avoid gluten products for 2 weeks to see if made a difference. Could also consider low FODMAPs diet if needed in the future.  In 6/2024 noted a normal cbc, CMP, celiac, TSH, magnesium, ferritin, iron, HBA1C was 6.1. B 12 < 150 & advised 1000 mcg IM daily 1 week then weekly 1 month then monthly 3 months then recheck & switch to oral & recheck on oral to see if sufficient. Seen by GI 7/21/24 & advised miralax & consider speech Rx for pill dysphagia, seen by pelvic therapy 8/9 & 8/29 & noted miralax & exercises given helping. B 12 normal 9/2024 & been on oral B 12 since. Feels better since did pelvic therapy & on miralax. Not sure if tried any elimination diets.      History of adenomatous polyp of colon  Screen for colon cancer  Colonoscopy done in May 2024 at [place] [facility] showed mild proctitis felt due to the bowel prep and 11 polyps removed several of them tubular adenoma, & serrated adenoma & advised recheck in 1 year.   - Colonoscopy Screening  Referral; Future    Pill dysphagia  Pill dysphagia. But no swallowing issues with different textures of food or liquids & no choking with liquids.  Reports no heartburn or reflux.  No red flag symptoms or signs. Met with GI 7/2024 & opted no speech therapy offered.      Muscle cramps  Prior intermittent sharp muscle cramps that occurred specific to one area right anterior abdomen & sometimes on the left with certain postures like with bending over have resolved. Not sure if B 12 helped but felt was  musculoskeletal in nature. 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 Due to report of worsening subjective weakness & pill dysphagia was referred to neurology but opted not to see them. No sig change & not worse since last seen. No falls or tremor,  no  ascending weakness. No double vision. No incontinence of bladder or bowels.  Vision is getting worse but does have early cataracts.     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 The rest of his left foot had been compensating for this all along and now that compensation has become painful on the outer lateral edge of his left foot. He has been going to the gym to use the machine to do dorsi and plantarflexion of the foot to strengthen his left calf muscle.     Right knee pain, unspecified chronicity  Right knee pain. Seen by [facility] orthopedics. X-ray showed loss of cartilage at the patella and was assessed to have possible arthritis.  He has no locking but it may feel like buckling if he is not careful.  There is no swelling or redness or warmth.  He was referred and did physical therapy at an associated PT group to strengthen the quads of the right knee. He felt he got more information from [employer] & strengthening his glutes helped knee.     Right foot pain  Right foot pain mostly along the outer edge. He had a history of a prior fifth toe fracture > 35 years ago. He did go see [facility] orthopedics x-rays done showed the old fracture but no new fracture. Had a corn that was pared down.  He felt pain under the pad of foot made it difficult to walk.  He usually wore flexible tennis shoes as felt stiff soled shoes made it hurt more. He did tend to walk barefoot in the house. He was referred by [facility] for orthotics and was planning to get that for his feet & see the podiatrist downstairs for more help. Not sure if did that yet.  When seen in June had been advised symptoms  could be due to plantar fascitis & to avoid walking barefoot and use stiff soled shoes if possible &  try rolling feet on a frozen bottle of water 10 minutes daily to see if that helped. Not sure if tried any of that as he feels symptoms are due to his corns & notes is to see podiatry for hs feet,    Left foot pain  Lateral edge left foot pain on the side where had reconstructive Achilles tendon repair at age 11 for polio related leg weakness involving a graft from the left big toe with residual big toe joint fusion.  This had affected mechanics of weightbearing on that side many year. There was no tingling or numbness or skin breakdown rash or swelling.  It was affecting his ability to exercise which was essential for him for his digestion as well as his physical and mental wellbeing.  He reported x-rays at [facility] orthopedics showed no arthritis or fractures.  In June I thought he probably had some plantar fasciitis or tendinopathy.  He had reported was going to start wearing orthotics given by [facility] & see  podiatry but not sure if did this and notes will be seeing podiatry.     Callus of foot  Had a callus on the bottom of the B/l fifth toe ball of foot area likely due to poor mechanics and weightbearing and [facility] gave him orthotics that could relieve that pressure. But not sure if using. To see podiatry.     Benign prostatic hyperplasia with nocturia  BPH + nocturia. History of enlarged prostate noted on a CT abdomen done many years ago for sharp pain that resolved of unknown etiology.  He reported no dysuria or hematuria no frequency urgency or hesitancy  or incontinence but did have nocturia once to twice at night that was not bothersome, likely related to the prostate. Seen by urology in 9/2024 for BPH with nocturia & discussed options & to do lifestyle management. Not drinking fluids after 7 pm has been helping.   PSA came back normal.   - PSA, screen; Future  - PSA, screen    Foreskin adhesions  Also reported   progressive difficulty retracting the foreskin over many years.  Reported no zipper injury or trauma or tight under clothing, reported no rash or swelling or ballooning of the foreskin or recurrent UTI's or emptying the bladder.  Referred to the urologist to further evaluate and treat. Seen by them 8/2024 & Given a trial of clobetasol for foreskin adhesions & advised to return to urology for surgical option if that failed. Notes trial of clobetasol made no difference. Encouraged to return to urology for surgical options if feels bothered by symptoms.     Sensorineural hearing loss, bilateral  Tinnitus, bilateral  Hyperacusis of both ears  SNHL/ B/l tinnitus/ hyperacusis: Seen by audiology 7/15/24. Feels doing ok without hearing aids.      History of eczema  Lipoma, unspecified site  History of eczema, previously had red itchy patches on hip, leg, finger, and back, triamcinolone was not very helpful then seen by [facility] and given clobetasol 0.05% oint with resolution of symptoms. No current rash. Advised to use rice grain amount of clobetasol sparingly only to red itchy areas as it is a potent steroid and not to apply for more than 2 weeks in a row to prevent tachyphylaxis, skin thinning and poor healing etcetera.  Sees [facility] for skin checks, noted had a ? AK lesion right face that was cryoed few weeks ago. Feels unchanged & will return to derm for it.     Health care maintenance  Health care maintenance reviewed  Unclear if has health care directives at home, given honoring choices to review  Vitals stable   Vision feels getting worse, wears glasses, has early cataracts, to see eye.  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  Former smoker: May have started in his 20's smoked socially less than 1 pack a week off-and-on many years thinks may have quit completely by 1980, quit > 40 yrs ago.  Did play pool in a bar that may have exposed him to passive smoke for many years after that, but currently does not meet criteria for CT lung cancer screening  Vaccines reviewed  Declines flu vaccine today  Given COVID vaccine  Encouraged to consider Shingrex at his pharmacy  Will do non fasting labs today & make further recommendations once reviewed   Return in 1 yr for a physical & sooner for any concerns.   - REVIEW OF HEALTH MAINTENANCE PROTOCOL ORDERS    Advanced directives, counseling/discussion    Need for influenza vaccination  Declined     Need for shingles vaccine  Encouraged to get     Need for COVID-19 vaccine  - COVID-19 12+ (PFIZER)    Encounter for lipid screening for cardiovascular disease  - Lipid panel reflex to direct LDL Non-fasting; Future  - Lipid panel reflex to direct LDL Non-fasting    Screening for prostate cancer  - PSA, screen; Future  - PSA, screen    Abnormal finding of blood chemistry, unspecified  - Ferritin; Future  - Ferritin    Patient has been advised of split billing requirements and indicates understanding: Yes    BMI  Estimated body mass index is 28.49 kg/m  as calculated from the following:    Height as of this encounter: 1.727 m (5' 8\").    Weight as of this encounter: 85 kg (187 lb 6.4 oz).   Weight management plan: Discussed healthy diet and exercise guidelines    Counseling  Appropriate preventive services were addressed with this patient via screening, questionnaire, or discussion as appropriate for fall prevention, nutrition, physical activity, Tobacco-use cessation, social engagement, weight loss and cognition.  Checklist reviewing preventive services available has " been given to the patient.  Reviewed patient's diet, addressing concerns and/or questions.   The patient was instructed to see the dentist every 6 months.   Discussed possible causes of fatigue. The patient was provided with written information regarding signs of hearing loss.  Work on weight loss  Regular exercise  See Patient Instructions          Subjective   Edin is a 74 year old, presenting for the following:  Annual Visit and Derm Problem (Spot on the RT cheek, DX as pre-cancer by DERM, was frozen and has to F/U, still looks the same. )        12/30/2024     1:10 PM   Additional Questions   Roomed by TIFFANIE Larsen   Accompanied by Self           History of Present Illness       Reason for visit:  Yearly general checkup and consutation about previous concernsHe exercises with enough effort to increase his heart rate 30 to 60 minutes per day.  He exercises with enough effort to increase his heart rate 7 days per week.   He is taking medications regularly.    Health Care Directive  Patient does not have a Health Care Directive: Discussed advance care planning with patient; information given to patient to review.      12/30/2024   General Health   How would you rate your overall physical health? Good   Feel stress (tense, anxious, or unable to sleep) Not at all         12/30/2024   Nutrition   Diet: Regular (no restrictions)         12/30/2024   Exercise   Days per week of moderate/strenuous exercise 7 days   Average minutes spent exercising at this level 40 min         12/30/2024   Social Factors   Frequency of gathering with friends or relatives More than three times a week         9/19/2023   Activities of Daily Living- Home Safety   Needs help with the following daily activities NO assistance is needed   Safety concerns in the home No grab bars in the bathroom         12/30/2024   Dental   Dentist two times every year? (!) NO         9/19/2023   Hearing Screening   Hearing concerns? No concerns          No data  to display                    Today's PHQ-2 Score:       2024     9:00 AM   PHQ-2 (  [employer])   Q1: Little interest or pleasure in doing things 0   Q2: Feeling down, depressed or hopeless 0   PHQ-2 Score 0         2024   Substance Use   Alcohol more than 3/day or more than 7/wk No   Do you have a current opioid prescription? No   How severe/bad is pain from 1 to 10? 1/10   Do you use any other substances recreationally? No     Social History     Tobacco Use    Smoking status: Former     Current packs/day: 0.00     Average packs/day: 0.3 packs/day for 9.0 years (2.3 ttl pk-yrs)     Types: Cigarettes     Start date: 1971     Quit date: 1980     Years since quittin.0     Passive exposure: Past    Smokeless tobacco: Never    Tobacco comments:     Social 1 pack a week , many yrs without , social 1 to 2 yrs, then none 5 yrs  & so on till thinks quit completely in    Vaping Use    Vaping status: Never Used   Substance Use Topics    Alcohol use: Yes     Comment: 1 glass of wine almost 5 days / week at dinner, 8 to 10 oz,    Drug use: Never       ASCVD Risk   The 10-year ASCVD risk score (Maryjo RODRÍGUEZ, et al., 2019) is: 22.8%    Values used to calculate the score:      Age: 74 years      Sex: Male      Is Non- : No      Diabetic: No      Tobacco smoker: No      Systolic Blood Pressure: 124 mmHg      Is BP treated: No      HDL Cholesterol: 34 mg/dL      Total Cholesterol: 176 mg/dL    Fracture Risk Assessment Tool  Link to Frax Calculator  Use the information below to complete the Frax calculator  : 1950  Sex: male  Weight (kg): 85 kg (actual weight)  Height (cm): 172.7 cm  Previous Fragility Fracture:  No  History of parent with fractured hip:  No  Current Smoking:  No  Patient has been on glucocorticoids for more than 3 months (5mg/day or more): No  Rheumatoid Arthritis on Problem List:  No  Secondary Osteoporosis on Problem List:  No  Consumes 3 or more  units of alcohol per day: No  Femoral Neck BMD (g/cm2)            Reviewed and updated as needed this visit by Provider                    Past Medical History:   Diagnosis Date    Enlarged prostate 06/14/2024    Reported noted on a prior CT abdomen      Former smoker 06/14/2024    May have started in his 20s smoked socially less than 1 pack a week off-and-on many years without. thinks may have quit completely by 1980      Tobacco use 02/16/2018     Past Surgical History:   Procedure Laterality Date    COLONOSCOPY  05/01/2024    COLONOSCOPY      x 2,  15 yrs ago    DENTAL SURGERY      several root canal    LEG SURGERY Left     age 11 polio reconstruction     OB History   No obstetric history on file.     Lab work is in process  Labs reviewed in EPIC  BP Readings from Last 3 Encounters:   12/30/24 124/70   09/04/24 109/79   07/24/24 128/80    Wt Readings from Last 3 Encounters:   12/30/24 85 kg (187 lb 6.4 oz)   09/04/24 81.6 kg (180 lb)   07/24/24 83 kg (183 lb)         Patient Active Problem List   Diagnosis    Prediabetes    Postpoliomyelitis syndrome (H)    Lipoma    Chronic constipation    Diverticular disease of colon    History of adenomatous polyp of colon    Pill dysphagia    Muscle cramps    Muscle weakness (generalized)    Left foot pain    Right knee pain, unspecified chronicity    Right foot pain    Callus of foot    Benign prostatic hyperplasia with nocturia    Foreskin adhesions    Tinnitus, bilateral    History of eczema    Vitamin B12 deficiency (non anemic)    Sensorineural hearing loss, bilateral    Hyperacusis of both ears     Past Surgical History:   Procedure Laterality Date    COLONOSCOPY  05/01/2024    COLONOSCOPY      x 2,  15 yrs ago    DENTAL SURGERY      several root canal    LEG SURGERY Left     age 11 polio reconstruction       Social History     Tobacco Use    Smoking status: Former     Current packs/day: 0.00     Average packs/day: 0.3 packs/day for 9.0 years (2.3 ttl pk-yrs)      Types: Cigarettes     Start date: 1971     Quit date: 1980     Years since quittin.0     Passive exposure: Past    Smokeless tobacco: Never    Tobacco comments:     Social 1 pack a week , many yrs without , social 1 to 2 yrs, then none 5 yrs  & so on till thinks quit completely in    Substance Use Topics    Alcohol use: Yes     Comment: 1 glass of wine almost 5 days / week at dinner, 8 to 10 oz,     Family History   Problem Relation Age of Onset    Other - See Comments Mother         digestive issues    Alcoholism Mother     Other - See Comments Father         digestive issues    Alcoholism Father     Bladder Cancer Sister     Parkinsonism Sister     Asthma Sister     Other - See Comments Sister         digestive issues    Bladder Cancer Brother          from it         Current Outpatient Medications   Medication Sig Dispense Refill    vitamin B-12 (CYANOCOBALAMIN) 2500 MCG sublingual tablet Take 2,500 mcg by mouth daily.      Cholecalciferol (VITAMIN D-3 PO)       clobetasol (TEMOVATE) 0.05 % external ointment Apply topically 2 times daily. 30 g 1    Folic Acid (FOLATE PO)        No Known Allergies  Recent Labs   Lab Test 24  1621 23  1715   A1C 6.1* 6.2*   LDL  --  111*   HDL  --  34*   TRIG  --  153*   ALT 15 16   CR 0.85 0.77   GFRESTIMATED >90 >90   POTASSIUM 4.6 4.2   TSH 3.49  --       Current providers sharing in care for this patient include:  Patient Care Team:  Argentina Brown MD as PCP - General (Family Medicine)  Leslie Mcdaniels APRN CNP as Assigned PCP  Mary Prince AuD as Audiologist (Audiology)  Natalio Boyd PA as Physician Assistant (Otolaryngology)  Saniya Westbrook PA-C as Physician Assistant (Gastroenterology)  Argentina Brown MD as MD (Family Medicine)  Conor Monet PA-C as Assigned Surgical Provider    The following health maintenance items are reviewed in Epic and correct as of today:  Health Maintenance   Topic Date Due    ZOSTER IMMUNIZATION  (1 of 2) Never done    INFLUENZA VACCINE (1) 09/01/2024    COVID-19 Vaccine (4 - 2024-25 season) 09/01/2024    ANNUAL REVIEW OF HM ORDERS  09/19/2024    COLORECTAL CANCER SCREENING  05/01/2025    MEDICARE ANNUAL WELLNESS VISIT  12/30/2025    FALL RISK ASSESSMENT  12/30/2025    GLUCOSE  06/14/2027    DTAP/TDAP/TD IMMUNIZATION (2 - Td or Tdap) 02/09/2028    LIPID  09/19/2028    ADVANCE CARE PLANNING  12/30/2029    HEPATITIS C SCREENING  Completed    PHQ-2 (once per calendar year)  Completed    Pneumococcal Vaccine: 50+ Years  Completed    RSV VACCINE  Completed    AORTIC ANEURYSM SCREENING (SYSTEM ASSIGNED)  Completed    HPV IMMUNIZATION  Aged Out    MENINGITIS IMMUNIZATION  Aged Out    RSV MONOCLONAL ANTIBODY  Aged Out     BACKGROUND  74-year-old gentleman retired  at the [employer], with history of chronic constipation, improved with miralax and pelvic therapy, seen by GI, diverticulosis, history of tubular adenomatous polyps and serrated adenomatous polyps removed from colon last colonoscopy 5/2024, has had a couple prior, has had a normal CT abdomen in the past, mild proctitis noted thought secondary to bowel prep in 2024, advised recheck in 1 year due to number of polyps, history of pill dysphagia only not to different textured foods solids or liquids, seen by GI, declined speech therapy, hx of resolved sharp very localized anterior abdominal muscle cramps occurring off-and-on many years with increased frequency as got older mostly with postural changes like bending, travel & playing the cello may have contributed, improved with miralax and pelvic therapy, history of subjective generalized muscle weakness, history of post poliomyelitis syndrome and residual left leg weakness prior Achilles reconstruction using left big toe tendon at age 11 when also had a big toe fusion.  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 did physical therapy to strengthen quads, history of callus right ball of fifth toe, resolved left tennis elbow, history of BPH with nocturia, an enlarged prostate reported on a prior CT abdomen, nocturia once to twice at night was not bothersome, &manageable with dietary & lifestyle changes,limiting fluids after 7 pm, seen by urology for foreskin adhesions, had difficulty retracting foreskin that had gotten progressive over many years, reported no history of trauma rash swelling ballooning recurrent UTI's or emptying bladder, did not improve with a trial of clobetasol & did not return to urology for surgical correction as next step, history of prediabetes hemoglobin A1c noted 6.2 in 2023, history of non anemia related B 12 def ( < 150) improved on injectable B 12 3 months & stable on 1000 mcg po B 12 since then, hx of eczema previously had red itchy patches on hip leg finger back seen by Derm consultants given clobetasol with resolution of symptoms and used the ointment sparingly as needed, hx of lipoma, SNHL B/l with tinnitus and hyperacusis, seen by ENT & audiology, no hearing aids chosen, former smoker may have started in his 20's smoked socially less than a pack a week off and on many years then feels quit completely by 1980.  No known drug allergies, [place]  negative,  [place] in [place] then in [place] before moving out of state lived in [facility] and then 8 years in [place] before moving back to [place] in 2022 after 40 yrs away.  Lived with his longtime partner Ladi and her dog.  Established with a [facility] provider in September 2023 and had a Medicare wellness visit.  Referred to physical therapy for foot pain  as well as for left tennis elbow at the time.  Due to prior smoking history ultrasound AAA ordered but never got done.  LDL was 111, PSA CBC CMP hep C screen was normal.  Hemoglobin A1c was 6.2.  Colonoscopy done 5/1/2024 showed proctitis, 9 polyps removed 1 was normal rest with a tubular adenoma or serrated adenoma also noted asymptomatic diverticulosis.  Was advised to recheck in 1 year given number of polyps.    Seen 6/14/24 first time by this provider. Noted a history of chronic constipation his whole life. He reported hard stools and difficulty evacuating bowels. He had a sense of feeling of incomplete evacuation. He had no rectal symptoms. He reported no use of enemas.  He was hesitant of getting dependent on laxatives.  He was not on any fiber supplement. He tried to drink adequate water but possibly not drinking enough. He felt he ate a high-fiber diet. It was unclear if prior polio had affected his pelvic muscles.  He had never done pelvic therapy.  He had never seen gastroenterology or colorectal for this concern.  He only discussed his symptoms briefly with the GI doing his scope after his first colonoscopy.  He recalled his parents had digestive issues that they never talked about but they may have also been alcoholics.  His sisters also had digestive issues of unclear etiology. He reported a CT abdomen done 15 years prior for sharp abdominal pain of unclear etiology was unremarkable. Colonoscopy done in May 2024 at [facility] showed mild proctitis felt to be due to the bowel prep and 11 polyps removed several of them precancerous  ( tubular adenoma, serrated adenoma) and advised recheck in 1 year.  Had had 2 prior colonoscopies more than 15 years  prior that did not suggest etiology for constipation. Did have diverticulosis likely a result of chronic constipation and encouraged a high-fiber diet. Was not in pain & exam suggested no acute surgical abdomen. Advised increasing water intake to 6 to 8  "glasses a day. Increase fiber in diet and stay active. Try MiraLAX 1 tablespoon in 8 ounces liquid daily  & titrate up and down to achieve a bowel movement daily that comes out like a \"snake in the lake\" or out like toothpaste.due to possibility of some pelvic floor dysfunction especially with history of polio that may be affecting evacuation of bowels was referred for pelvic therapy. Could use a squatty potty stool, so the knees were higher than level of the pelvis to get better anatomical alignment of bowel to defecate.labs done & advised could also do a trial of avoiding all dairy for 2 weeks see if it made a difference and if it did not, then avoiding gluten products for 2 weeks and see if that made a difference. Could also consider low FODMAPs diet if needed in the future. Given duration of symptoms I did go ahead and put a referral in for a GI consult to further evaluate and treat. Could consider a colorectal specialist as well in the future.  History of trouble swallowing especially big/dry pills but no trouble swallowing different textures of food or liquids & no choking with liquids.  Reported no heartburn or reflux.  May be related to aging esophagus.  But given reported symptoms of increased muscle weakness in the prior 6 months and muscle cramps in background of postpolio syndrome referred to the neurologist to further evaluate and treat.  Hx of sharp muscle cramps specific to one area that came & went in the right anterior abdomen sometimes on the left mostly with certain postures like bending over, it sounded musculoskeletal in nature. 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  He was not sure if he could change the ergonomics of his performance but encouraged to discuss with physical therapy when he saw them.   History of prior polio & postpolio syndrome with chronic left leg weakness but " also reported had subjective  generalized muscle weakness that had worsened in the prior 6 months.  There was no tremor.  No double vision.  No falls.  No incontinence of bladder or bowels.  Vision was getting worse but did have early cataracts. Labs done & referred to neurology to evaluate more.  History of polio with post poliomyelitis syndrome and residual left leg weakness, had prior Achilles reconstructive surgery at age 11 when they removed the tendon from the top of his big toe to graft to the Achilles and then fused the mid joint of the big toe, this had made it difficult for him to walk as he could not push off his big toe and had been ongoing for over 60 years. The rest of his left foot had been compensating for this all along and overtime he felt that compensation had become painful on the outer lateral edge of his left foot. He had been going to the gym to use the machine to do dorsi and plantarflexion of the foot to strengthen his left calf muscle.  He was referred to neurology to further evaluate and treat.  Lateral edge left foot pain on the side where had reconstructive Achilles tendon repair at age 11 for polio related leg weakness involving a graft from the left big toe with residual big toe joint fusion.  This had affected mechanics of weightbearing on that side many years that he felt was showing up.  There was no tingling or numbness or skin breakdown rash or swelling.  It was affecting his ability to exercise which was essential for him for his digestion as well as his physical and mental wellbeing.  He used to walk a lot and that had decreased due to foot pain. Did have x-rays at [facility] orthopedics reported no arthritis or fractures.  May have some plantar fasciitis or tendinopathy.  Was  to start wearing orthotics given by them and see what that did. He also planned to see podiatry to see what they said.   Noted had also been having right foot pain mostly along the outer edge. He has had a  history of a prior fifth toe fracture > 35 years prior. He did go see [facility] orthopedics x-rays done showed the old fracture but no new fracture. Adrian was pared down. Felt pain under the pad of foot which made it difficult to walk.  He was referred for orthotics and was planning to get that for his feet.  He was also planning to see the podiatrist downstairs for more help . He usually wore flexible tennis shoes as felt stiff soled shoes made it hurt more. He did tend to walk barefoot in the house.  Discussed possibility of plantar fasciitis contributing to symptoms & recommended avoid walking barefoot and use stiff soled shoes if possible. & could try rolling feet on a frozen bottle of water 10 minutes daily to see if that helped.   The callus on the bottom of the right fifth toe ball of foot area likely due to poor mechanics and weightbearing and orthotics could relieve that pressure and help with the pain.  When he saw [facility] orthopedics for his feet he mentioned his right knee was bothering him, they x-rayed it and he ended up seeing a different orthopedist at [facility] regarding the right knee several weeks prior. He was told that he had loss of cartilage at the patella and was assessed to have possible arthritis.  He had no locking but  it could feel like buckling if he was not careful.  There was no swelling or redness or warmth.  He was referred and had been doing physical therapy at associated PT group to strengthen the quads of the right knee. Was to continue care with [facility] orthopedics and physical therapy regarding this.  History of enlarged prostate noted on a CT abdomen done many years ago for sharp pain that resolved of unknown etiology.  He reported no dysuria or hematuria no frequency urgency or hesitancy  or incontinence but did have nocturia once to twice at night that he felt was not bothersome, likely related to the prostate  Also reported progressive difficulty retracting the foreskin over many years.   Reported no zipper injury or trauma or tight under clothing, reported no rash or swelling or ballooning of the foreskin or recurrent UTI's or emptying the bladder. Was referred to the urologist to further evaluate and treat.  History of prediabetes.  Hemoglobin A1c at physical in September 2023 was 6.2. Encouraged low carbohydrate intake smaller portions of wheat pasta gluten rice potato and more Mediterranean style diet avoid eating 3 hours before bedtime and work on some weight loss to help prevent diabetes. Labs came back showing Hemoglobin A1c improved to 6.1 & to recheck in 6 months ideally  Bilateral tinnitus was new.  Reported no known hearing loss.  Most common cause of tinnitus was likely due to some sensorineural hearing loss.  It could also be due to other causes like loud sound exposure, trauma, tumor etc. Examination did not suggest any concerning findings but given age and symptoms referred to ENT and audiology to further evaluate and treat.  History of eczema ,previously had red itchy patches on hip, leg, finger, and back, triamcinolone was not very helpful then seen by [facility] and given clobetasol 0.05% oint with resolution of symptoms and used sparingly as needed mostly to mid back . Had no rash. Advised continue to use rice grain amount of clobetasol sparingly to red itchy areas as it was a potent steroid and not to apply for more than 2 weeks in a row to prevent tacky phylaxis, skin thinning and poor healing etcetera.  Former smoker: May have started in his 20's smoked socially less than 1 pack a week off-and-on many years without, thought may have quit completely by 1980, quit > 40 yrs ago.  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    I was thinking of checking his vitamin D but  not covered by insurance. Recommended  was easier to take a supplement of vitamin D 2000 units daily. To follow-up with dentist for root canal as planned. & to continue care with his routine primary care provider and follow-up with me as needed    Labs came back with normal cbc, CMP, celiac, TSH, magnesium, ferritin, iron, HBA1C was 6.1. B 12 < 150 & advised 1000 mcg I'm daily 1 week then weekly 1 month then monthly 3 months then recheck & switch to oral & recheck on oral to see if sufficient. Had B 12 teaching on 6/20/24 with nurse. Seen by audiology 7/15/24  & told had B/l SNHL, hyperacusis & related tinnitus. Seen by GI 7/21/24 & advised miralax & consider speech Rx for pill dysphagia, seen by pelvic therapy 8/0 & 8/29 & noted miralax & exercises given helping. Seen by urology in 9/2024 for BPH with nocturia & discussed options & to do lifestyle management. Given a trial of clobetasol for foreskin adhesions & advised to return to urology for surgical option if that failed. On 9/21/24 noted Vit B 12 was wnl & advised to switch to 2500 mcg sublingual B 12 & recheck in 1 month. Mn  negative.     CURRENTLY  Here for medicare wellness & follow up  Seen once in June 2024.   reviewed  No ACP on file, thought had at home, given honoring choices to review    Vitals stable   Vision feels getting worse, wears glasses, has early cataracts, to see eye.  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  Did play pool in a bar that may have exposed him to passive smoke for many years after that, but currently does not meet criteria for CT lung cancer screening  Vaccines reviewed  Declines flu vaccine today  Will do COVID vaccine  Encouraged to consider Shingrex at his pharmacy  Will do non fasting labs today & make further recommendations once reviewed     Vit B 12 def dx in June 2024, not anemic, was < 150, replaced with injectable daily vit B 12 1000 mcg 1 wk, then wkly 1 month then monthly 3 months & then .in sept 2024 noted was improved to 511. Advised to stop injection & switch to vit B sl 2500 mcg & recheck in 1 month. Here 3 months later. Notes been taking 1000 mcg B 12 orally instead since last shot.also on folate unknown dose & vit d . Will check values today. He reports no change in any symptoms on taking vit B 12 but noted gi symptoms & muscle cramps better today& subjective generalized weakness not any worse. Offered could check for pernicious anemia but would not soto management & opted not o those additional tests today.    History of prediabetes.  Hemoglobin A1c at physical in September 2023 was 6.2  & in 6/2024 was 6.1. was encouraged low carbohydrate intake, smaller portions of wheat, pasta, gluten, rice potato and more Mediterranean style diet avoid eating 3 hours before bedtime and work on some weight loss to help prevent diabetes.will check today.     History of chronic constipation his whole life ( hard stools, difficulty evacuating bowels &  sense of feeling of incomplete evacuation). He had no rectal symptoms & reported no use of enemas. Was not on any fiber supplement. Reported a CT abdomen done 15 years prior for sharp abdominal pain of unclear etiology  had been unremarkable Colonoscopy done in May 2024 at [facility] showed mild proctitis felt due to the bowel prep and 11 polyps removed several of them tubular adenoma, & serrated adenoma & advised recheck in  1 year.  Had had 2 prior colonoscopies more than 15 years prior that the reported did not suggest etiology for constipation.  Did have diverticulosis likely a result of chronic constipation and encouraged a high-fiber diet. Was advised increasing water intake to 6 to 8 glasses a day. Increasing fiber in diet and staying active & trying MiraLAX 1 tablespoon in 8 ounces liquid daily may titrate up and down to achieve a bowel movement daily due to possibility of  pelvic floor dysfunction ( history of polio)  that may be affecting evacuation of bowels was referred for pelvic therapy  & due to duration of symptoms referred to GI for a formal consult. Was also advised  a trial of avoiding all dairy for 2 weeks see if that made a difference and if not, then to avoid gluten products for 2 weeks to see if made a difference. Could also consider low FODMAPs diet if needed in the future.  In 6/2024 noted a normal cbc, CMP, celiac, TSH, magnesium, ferritin, iron, HBA1C was 6.1. B 12 < 150 & advised 1000 mcg IM daily 1 week then weekly 1 month then monthly 3 months then recheck & switch to oral & recheck on oral to see if sufficient. Seen by GI 7/21/24 & advised miralax & consider speech Rx for pill dysphagia, seen by pelvic therapy 8/9 & 8/29 & noted miralax & exercises given helping. B 12 normal 9/2024 & been on oral B 12 since. Feels better since did pelvic therapy & on miralax.     Pill dysphagia. But no swallowing issues with different textures of food or liquids & no choking with liquids.  Reports no heartburn or reflux.  No red flag symptoms or signs. Met with GI 7/2024 & opted no speech therapy offered.      Prior intermittent sharp muscle cramps that occurred specific to one area right anterior abdomen sometimes on the left with certain postures like bending over have resolved. Not sure if B 12 helped but felt was  musculoskeletal in nature. 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 Due to report of worsening subjective weakness & pill dysphagia was referred to neurology but opted not to see them. No sig change &not worse since last seen. No falls or tremor,  no ascending weakness. No double vision. No incontinence of bladder or bowels.  Vision is getting worse but does have early cataracts.      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 The rest of his left foot had been compensating for this all along and now that compensation has become painful on the outer lateral edge of his left foot. He has been going to the gym to use the machine to do dorsi and plantarflexion of the foot to strengthen his left calf muscle.     Right knee pain. Seen by [facility] orthopedics. X-ray showed loss of cartilage at the patella and was assessed to have possible arthritis.  He has no locking but it may feel like buckling if he is not careful.  There is no swelling or redness or warmth.  He was referred and did physical therapy at an associated PT group to strengthen the quads of the right knee. He felt he got more information from [facility] & strengthening his glutes helped knee.     Right foot pain mostly along the outer edge. He had a history of a prior fifth toe fracture > 35 years ago. He did go see [facility] orthopedics x-rays done showed the old fracture but no new fracture. Had a corn that was pared down.  He felt pain under the pad of foot made it difficult to walk.  He usually wore flexible tennis shoes as felt stiff soled shoes made it  hurt more. He did tend to walk barefoot in the house. He was referred by [facility] for orthotics and was planning to get that for his feet & see the podiatrist downstairs for more help. Not sure if did that yet.  When seen in June had been advised symptoms could be due to plantar fascitis & to avoid walking barefoot and use stiff soled shoes if possible &  try rolling feet on a frozen bottle of water 10 minutes daily to see if that helped. Not sure if tried any of that as he feels symptoms are due to his corns & notes is to see podiatry for hs feet,    Lateral edge left foot pain on the side where had reconstructive Achilles tendon repair at age 11 for polio related leg weakness involving a graft from the left big toe with residual big toe joint fusion.  This had affected mechanics of weightbearing on that side many year. There was no tingling or numbness or skin breakdown rash or swelling.  It was affecting his ability to exercise which was essential for him for his digestion as well as his physical and mental wellbeing.  He reported x-rays at [facility] orthopedics showed no arthritis or fractures.  In June I thought he probably had some plantar fasciitis or tendinopathy.  He had reported was going to start wearing orthotics given by [facility] & see  podiatry but not sure if did this ad notes will be seeing podiatry.     Had a callus on the bottom of the B/l fifth toe ball of foot area likely due to poor mechanics and weightbearing and orthotics could relieve that pressure. Not sure if using. To see podiatry.      BPH + nocturia. History of enlarged prostate noted on a CT abdomen done many years ago for sharp pain that resolved of unknown etiology.  He reported no dysuria or hematuria no frequency urgency or hesitancy  or incontinence but did have nocturia once to twice at night that was not bothersome, likely related to the prostate. Seen by urology in 9/2024 for BPH with nocturia & discussed options & to do lifestyle management.  "Not drinking fluids after 7 pm has been helping.     Also reported  progressive difficulty retracting the foreskin over many years.  Reported no zipper injury or trauma or tight under clothing, reported no rash or swelling or ballooning of the foreskin or recurrent UTI's or emptying the bladder.  Referred to the urologist to further evaluate and treat. Seen y them 8/2024 & Given a trial of clobetasol for foreskin adhesions & advised to return to urology for surgical option if that failed. Notes trial of clobetasol made no difference. Encouraged to return to urology for surgical options if feels bothered by symptoms.      SNHL/ B/l tinnitus/ hyperacusis: Seen by audiology 7/15/24. Feels doing ok without hearing aids.       History of eczema, previously had red itchy patches on hip, leg, finger, and back, triamcinolone was not very helpful then seen by [facility] and given clobetasol 0.05% oint with resolution of symptoms. No current rash. Advised to use rice grain amount of clobetasol sparingly only to red itchy areas as it is a potent steroid and not to apply for more than 2 weeks in a row to prevent tachyphylaxis, skin thinning and poor healing etcetera.    Sees [facility]. Had a ? AK lesion right face that was cryoed few weeks ago. Feels unchanged & will return to derm for it.      Vaccines reviewed and up to date other than shingles vaccine and encouraged to get this at his pharmacy.      Review of Systems  Constitutional, HEENT, cardiovascular, pulmonary, GI, , musculoskeletal, neuro, skin, endocrine and psych systems are negative, except as otherwise noted.     Objective    Exam  /70 (BP Location: Right arm, Patient Position: Sitting, Cuff Size: Adult Regular)   Pulse 76   Temp 97.4  F (36.3  C) (Temporal)   Resp 22   Ht 1.727 m (5' 8\")   Wt 85 kg (187 lb 6.4 oz)   SpO2 97%   BMI 28.49 kg/m     Estimated body mass index is 28.49 kg/m  as calculated from the following:    Height as of " "this encounter: 1.727 m (5' 8\").    Weight as of this encounter: 85 kg (187 lb 6.4 oz).    Physical Exam  GENERAL: alert and no distress  EYES: Eyes grossly normal to inspection, PERRL and conjunctivae and sclerae normal, glasses  HENT: ear canals and TM's normal, nose and mouth without ulcers or lesions  NECK: no adenopathy, no asymmetry, masses, or scars  RESP: lungs clear to auscultation - no rales, rhonchi or wheezes  CV: regular rate and rhythm, normal S1 S2, no S3 or S4, no murmur, click or rub, no peripheral edema  ABDOMEN: soft, nontender, no hepatosplenomegaly, no masses and bowel sounds normal  MS: no gross musculoskeletal defects noted, no edema, left leg thinner than right,has surgical scars left foot  SKIN: no suspicious lesions or rashes, flat erythematous non blanching lesion 1/2 c, right cheek  NEURO: Normal strength and tone, mentation intact and speech normal  PSYCH: mentation appears normal, affect normal/bright         12/30/2024   Mini Cog   Clock Draw Score 2 Normal   3 Item Recall 3 objects recalled   Mini Cog Total Score 5     Signed Electronically by: Argentina Brown MD  "

## 2024-12-30 NOTE — RESULT ENCOUNTER NOTE
Hello -    Here are my comments about your recent results:  -Normal red blood cell (hgb) levels, normal white blood cell count and normal platelet levels.  -A1C (diabetic test) is 5.9 remains in the prediabetic range.  Improved slightly from before.  Encouraged to lower carbohydrate intake some weight loss and recheck in 6 months to 1 year.  For additional lab test information, labtestsonline.org is an excellent reference..    Please let us know if you have any questions or concerns.     Regards,  Argentina Brown MD

## 2024-12-30 NOTE — PATIENT INSTRUCTIONS
Patient Education   Preventive Care Advice   This is general advice given by our system to help you stay healthy. However, your care team may have specific advice just for you. Please talk to your care team about your preventive care needs.  Nutrition  Eat 5 or more servings of fruits and vegetables each day.  Try wheat bread, brown rice and whole grain pasta (instead of white bread, rice, and pasta).  Get enough calcium and vitamin D. Check the label on foods and aim for 100% of the RDA (recommended daily allowance).  Lifestyle  Exercise at least 150 minutes each week  (30 minutes a day, 5 days a week).  Do muscle strengthening activities 2 days a week. These help control your weight and prevent disease.  No smoking.  Wear sunscreen to prevent skin cancer.  Have a dental exam and cleaning every 6 months.  Yearly exams  See your health care team every year to talk about:  Any changes in your health.  Any medicines your care team has prescribed.  Preventive care, family planning, and ways to prevent chronic diseases.  Shots (vaccines)   HPV shots (up to age 26), if you've never had them before.  Hepatitis B shots (up to age 59), if you've never had them before.  COVID-19 shot: Get this shot when it's due.  Flu shot: Get a flu shot every year.  Tetanus shot: Get a tetanus shot every 10 years.  Pneumococcal, hepatitis A, and RSV shots: Ask your care team if you need these based on your risk.  Shingles shot (for age 50 and up)  General health tests  Diabetes screening:  Starting at age 35, Get screened for diabetes at least every 3 years.  If you are younger than age 35, ask your care team if you should be screened for diabetes.  Cholesterol test: At age 39, start having a cholesterol test every 5 years, or more often if advised.  Bone density scan (DEXA): At age 50, ask your care team if you should have this scan for osteoporosis (brittle bones).  Hepatitis C: Get tested at least once in your life.  STIs (sexually  transmitted infections)  Before age 24: Ask your care team if you should be screened for STIs.  After age 24: Get screened for STIs if you're at risk. You are at risk for STIs (including HIV) if:  You are sexually active with more than one person.  You don't use condoms every time.  You or a partner was diagnosed with a sexually transmitted infection.  If you are at risk for HIV, ask about PrEP medicine to prevent HIV.  Get tested for HIV at least once in your life, whether you are at risk for HIV or not.  Cancer screening tests  Cervical cancer screening: If you have a cervix, begin getting regular cervical cancer screening tests starting at age 21.  Breast cancer scan (mammogram): If you've ever had breasts, begin having regular mammograms starting at age 40. This is a scan to check for breast cancer.  Colon cancer screening: It is important to start screening for colon cancer at age 45.  Have a colonoscopy test every 10 years (or more often if you're at risk) Or, ask your provider about stool tests like a FIT test every year or Cologuard test every 3 years.  To learn more about your testing options, visit:   .  For help making a decision, visit:   https://bit.ly/vk73497.  Prostate cancer screening test: If you have a prostate, ask your care team if a prostate cancer screening test (PSA) at age 55 is right for you.  Lung cancer screening: If you are a current or former smoker ages 50 to 80, ask your care team if ongoing lung cancer screenings are right for you.  For informational purposes only. Not to replace the advice of your health care provider. Copyright   2023 [facility]. All rights reserved. Clinically reviewed by the [facility] Transitions Program. Aframe 353144 - REV 01/24.  Preventing Falls: Care Instructions  Injuries and health problems such as trouble walking or poor eyesight can increase your risk of falling. So can some medicines. But there are things you can do to help  "prevent falls. You can exercise to get stronger. You can also arrange your home to make it safer.    Talk to your doctor about the medicines you take. Ask if any of them increase the risk of falls and whether they can be changed or stopped.   Try to exercise regularly. It can help improve your strength and balance. This can help lower your risk of falling.         Practice fall safety and prevention.   Wear low-heeled shoes that fit well and give your feet good support. Talk to your doctor if you have foot problems that make this hard.  Carry a cellphone or wear a medical alert device that you can use to call for help.  Use stepladders instead of chairs to reach high objects. Don't climb if you're at risk for falls. Ask for help, if needed.  Wear the correct eyeglasses, if you need them.        Make your home safer.   Remove rugs, cords, clutter, and furniture from walkways.  Keep your house well lit. Use night-lights in hallways and bathrooms.  Install and use sturdy handrails on stairways.  Wear nonskid footwear, even inside. Don't walk barefoot or in socks without shoes.        Be safe outside.   Use handrails, curb cuts, and ramps whenever possible.  Keep your hands free by using a shoulder bag or backpack.  Try to walk in well-lit areas. Watch out for uneven ground, changes in pavement, and debris.  Be careful in the winter. Walk on the grass or gravel when sidewalks are slippery. Use de-icer on steps and walkways. Add non-slip devices to shoes.    Put grab bars and nonskid mats in your shower or tub and near the toilet. Try to use a shower chair or bath bench when bathing.   Get into a tub or shower by putting in your weaker leg first. Get out with your strong side first. Have a phone or medical alert device in the bathroom with you.   Where can you learn more?  Go to https://www.Astrowise.net/patiented  Enter G117 in the search box to learn more about \"Preventing Falls: Care Instructions.\"  Current as of: " July 31, 2024  Content Version: 14.3    2024 [employer].   Care instructions adapted under license by your healthcare professional. If you have questions about a medical condition or this instruction, always ask your healthcare professional. [employer] disclaims any warranty or liability for your use of this information.    Hearing Loss: Care Instructions  Overview     Hearing loss is a sudden or slow decrease in how well you hear. It can range from slight to profound. Permanent hearing loss can occur with aging. It also can happen when you are exposed long-term to loud noise. Examples include listening to loud music, riding motorcycles, or being around other loud machines.  Hearing loss can affect your work and home life. It can make you feel lonely or depressed. You may feel that you have lost your independence. But hearing aids and other devices can help you hear better and feel connected to others.  Follow-up care is a key part of your treatment and safety. Be sure to make and go to all appointments, and call your doctor if you are having problems. It's also a good idea to know your test results and keep a list of the medicines you take.  How can you care for yourself at home?  Avoid loud noises whenever possible. This helps keep your hearing from getting worse.  Always wear hearing protection around loud noises.  Wear a hearing aid as directed.  A professional can help you pick a hearing aid that will work best for you.  You can also get hearing aids over the counter for mild to moderate hearing loss.  Have hearing tests as your doctor suggests. They can show whether your hearing has changed. Your hearing aid may need to be adjusted.  Use other devices as needed. These may include:  Telephone amplifiers and hearing aids that can connect to a television, stereo, radio, or microphone.  Devices that use lights or vibrations. These alert you to the doorbell, a ringing telephone, or a baby  "monitor.  Television closed-captioning. This shows the words at the bottom of the screen. Most new TVs can do this.  TTY (text telephone). This lets you type messages back and forth on the telephone instead of talking or listening. These devices are also called TDD. When messages are typed on the keyboard, they are sent over the phone line to a receiving TTY. The message is shown on a monitor.  Use text messaging, social media, and email if it is hard for you to communicate by telephone.  Try to learn a listening technique called speechreading. It is not lipreading. You pay attention to people's gestures, expressions, posture, and tone of voice. These clues can help you understand what a person is saying. Face the person you are talking to, and have them face you. Make sure the lighting is good. You need to see the other person's face clearly.  Think about counseling if you need help to adjust to your hearing loss.  When should you call for help?  Watch closely for changes in your health, and be sure to contact your doctor if:    You think your hearing is getting worse.     You have new symptoms, such as dizziness or nausea.   Where can you learn more?  Go to https://www.ImageShack.net/patiented  Enter R798 in the search box to learn more about \"Hearing Loss: Care Instructions.\"  Current as of: September 27, 2023  Content Version: 14.3    2024 [employer].   Care instructions adapted under license by your healthcare professional. If you have questions about a medical condition or this instruction, always ask your healthcare professional. [employer] disclaims any warranty or liability for your use of this information.    Learning About Sleeping Well  What does sleeping well mean?     Sleeping well means getting enough sleep to feel good and stay healthy. How much sleep is enough varies among people.  The number of hours you sleep and how you feel when you wake up are both important. If you do " not feel refreshed, you probably need more sleep. Another sign of not getting enough sleep is feeling tired during the day.  Experts recommend that adults get at least 7 or more hours of sleep per day. Children and older adults need more sleep.  Why is getting enough sleep important?  Getting enough quality sleep is a basic part of good health. When your sleep suffers, your physical health, mood, and your thoughts can suffer too. You may find yourself feeling more grumpy or stressed. Not getting enough sleep also can lead to serious problems, including injury, accidents, anxiety, and depression.  What might cause poor sleeping?  Many things can cause sleep problems, including:  Changes to your sleep schedule.  Stress. Stress can be caused by fear about a single event, such as giving a speech. Or you may have ongoing stress, such as worry about work or school.  Depression, anxiety, and other mental or emotional conditions.  Changes in your sleep habits or surroundings. This includes changes that happen where you sleep, such as noise, light, or sleeping in a different bed. It also includes changes in your sleep pattern, such as having jet lag or working a late shift.  Health problems, such as pain, breathing problems, and restless legs syndrome.  Lack of regular exercise.  Using alcohol, nicotine, or caffeine before bed.  How can you help yourself?  Here are some tips that may help you sleep more soundly and wake up feeling more refreshed.  Your sleeping area   Use your bedroom only for sleeping and sex. A bit of light reading may help you fall asleep. But if it doesn't, do your reading elsewhere in the house. Try not to use your TV, computer, smartphone, or tablet while you are in bed.  Be sure your bed is big enough to stretch out comfortably, especially if you have a sleep partner.  Keep your bedroom quiet, dark, and cool. Use curtains, blinds, or a sleep mask to block out light. To block out noise, use earplugs,  "soothing music, or a \"white noise\" machine.  Your evening and bedtime routine   Create a relaxing bedtime routine. You might want to take a warm shower or bath, or listen to soothing music.  Go to bed at the same time every night. And get up at the same time every morning, even if you feel tired.  What to avoid   Limit caffeine (coffee, tea, caffeinated sodas) during the day, and don't have any for at least 6 hours before bedtime.  Avoid drinking alcohol before bedtime. Alcohol can cause you to wake up more often during the night.  Try not to smoke or use tobacco, especially in the evening. Nicotine can keep you awake.  Limit naps during the day, especially close to bedtime.  Avoid lying in bed awake for too long. If you can't fall asleep or if you wake up in the middle of the night and can't get back to sleep within about 20 minutes, get out of bed and go to another room until you feel sleepy.  Avoid taking medicine right before bed that may keep you awake or make you feel hyper or energized. Your doctor can tell you if your medicine may do this and if you can take it earlier in the day.  If you can't sleep   Imagine yourself in a peaceful, pleasant scene. Focus on the details and feelings of being in a place that is relaxing.  Get up and do a quiet or boring activity until you feel sleepy.  Avoid drinking any liquids before going to bed to help prevent waking up often to use the bathroom.  Where can you learn more?  Go to https://www.Hyperpublic.net/patiented  Enter J942 in the search box to learn more about \"Learning About Sleeping Well.\"  Current as of: July 31, 2024  Content Version: 14.3    2024 [employer].   Care instructions adapted under license by your healthcare professional. If you have questions about a medical condition or this instruction, always ask your healthcare professional. [employer] disclaims any warranty or liability for your use of this information.       "

## 2024-12-31 NOTE — RESULT ENCOUNTER NOTE
Hello -    Here are my comments about your recent results:  The 10-year ASCVD risk score (Maryjo RODRÍGUEZ, et al., 2019) is: 23.8%    Values used to calculate the score:      Age: 74 years      Sex: Male      Is Non- : No      Diabetic: No      Tobacco smoker: No      Systolic Blood Pressure: 124 mmHg      Is BP treated: No      HDL Cholesterol: 30 mg/dL      Total Cholesterol: 175 mg/dL  While LDL is 101 triglycerides elevated at 218 HDL is low and overall cardiovascular risk is high approximately 30% of having a risk of a heart attack or stroke in the next 10 years.  Given these values recommendation is to start a statin medication to lower cardiovascular risk if agreeable I can send this in if desiring more objective data for coronary arteries we could do a CT calcium score to assess calcification of coronary arteries if this is positive recommendation would be aggressive medical risk factor modification with statins excetra.    -PSA (prostate specific antigen) test is normal.  This indicates a low likelihood of prostate cancer.  ADVISE: rechecking this in 1 year.  -Liver and gallbladder tests are normal (ALT,AST, Alk phos, bilirubin), kidney function is normal (Cr, GFR), sodium is normal, potassium is normal, calcium is normal, glucose is normal.  -TSH (thyroid stimulating hormone) level is normal which indicates normal thyroid function.  -Vitamin B12 result is normal continue with 1000 mcg of vitamin B12 as you are doing orally  -Ferritin (iron) level is normal.  -Microalbumin (urine protein) test is normal.  ADVISE: rechecking this annually.  For additional lab test information, labtestsonline.org is an excellent reference..    Please let us know if you have any questions or concerns.     Regards,  Argentina Brown MD

## 2024-12-31 NOTE — RESULT ENCOUNTER NOTE
Hello -    Here are my comments about your recent results:  Normal folate level    Please let us know if you have any questions or concerns.     Regards,  Argentina Brown MD

## 2025-01-01 PROBLEM — Z86.39 HISTORY OF NON ANEMIC VITAMIN B12 DEFICIENCY: Status: ACTIVE | Noted: 2024-06-17

## 2025-01-01 PROBLEM — M62.81 MUSCLE WEAKNESS (GENERALIZED): Status: RESOLVED | Noted: 2024-06-14 | Resolved: 2025-01-01

## 2025-02-25 ENCOUNTER — TELEPHONE (OUTPATIENT)
Dept: GASTROENTEROLOGY | Facility: CLINIC | Age: 75
End: 2025-02-25
Payer: COMMERCIAL

## 2025-02-25 NOTE — TELEPHONE ENCOUNTER
"Endoscopy Scheduling Screen    Have you had any respiratory illness or flu-like symptoms in the last 10 days?  No    What is your communication preference for Instructions and/or Bowel Prep?   MyChart    What insurance is in the chart?  Other:  [employer] Medicare    Ordering/Referring Provider: Kevin   (If ordering provider performs procedure, schedule with ordering provider unless otherwise instructed. )    BMI: Estimated body mass index is 28.49 kg/m  as calculated from the following:    Height as of 12/30/24: 1.727 m (5' 8\").    Weight as of 12/30/24: 85 kg (187 lb 6.4 oz).     Sedation Ordered  moderate sedation.   If patient BMI > 50 do not schedule in [facility].    If patient BMI > 45 do not schedule at [facility].    Are you taking methadone or Suboxone?  NO, No RN review required.    Have you been diagnosed and are being treated for severe PTSD or severe anxiety?  NO, No RN review required.    Are you taking any prescription medications for pain 3 or more times per week?   NO, No RN review required.    Do you have a history of malignant hyperthermia?  No    (Females) Are you currently pregnant?        Have you been diagnosed or told you have pulmonary hypertension?   No    Do you have an LVAD?  No    Have you been told you have moderate to severe sleep apnea?  No.    Have you been told you have COPD, asthma, or any other lung disease?  No    Do you  have a history of any heart conditions or any upcoming cardiac exams like an echo, angiogram, stress test, or ablation?  No     Have you ever had or are you waiting for an organ transplant?  No. Continue scheduling, no site restrictions.    Have you had a stroke or transient ischemic attack (TIA aka \"mini stroke\") in the last 2 years?   No.    Have you been diagnosed with or been told you have cirrhosis of the liver?   No.    Are you currently on dialysis?   No    Do you need assistance transferring?   No    BMI: Estimated body mass index is 28.49 kg/m  as calculated from the " "following:    Height as of 12/30/24: 1.727 m (5' 8\").    Weight as of 12/30/24: 85 kg (187 lb 6.4 oz).     Is patients BMI > 40 and scheduling location [facility]?  No    Do you take an injectable or oral medication for weight loss or diabetes (excluding insulin)?  No    Do you take the medication Naltrexone?  No    Do you take blood thinners?  No       Prep   Are you currently on dialysis or do you have chronic kidney disease?  No    Do you have a diagnosis of diabetes?  No    Do you have a diagnosis of cystic fibrosis (CF)?  No    On a regular basis do you go 3 -5 days between bowel movements?  No    BMI > 40?  No    Preferred Pharmacy:    [facility]/pharmacy #[id number] - [place], [place] - [address]  [address] [place] [address]  Phone: [phone] Fax: [phone]    Final Scheduling Details     Procedure scheduled  Colonoscopy    Surgeon:  Ginny     Date of procedure:  6/6/25     Pre-OP / PAC:   No - Not required for this site.    Location  [facility] - [facility] - Patient preference.    Sedation   MAC/Deep Sedation - Patient preference. Patient did request MAC sedation      Patient Reminders:   You will receive a call from a Nurse to review instructions and health history.  This assessment must be completed prior to your procedure.  Failure to complete the Nurse assessment may result in the procedure being cancelled.      On the day of your procedure, please designate an adult(s) who can drive you home stay with you for the next 24 hours. The medicines used in the exam will make you sleepy. You will not be able to drive.      You cannot take public transportation, ride share services, or non-medical taxi service without a responsible caregiver.  Medical transport services are allowed with the requirement that a responsible caregiver will receive you at your destination.  We require that drivers and caregivers are confirmed prior to your procedure.   "

## 2025-05-21 ENCOUNTER — TELEPHONE (OUTPATIENT)
Dept: GASTROENTEROLOGY | Facility: CLINIC | Age: 75
End: 2025-05-21
Payer: COMMERCIAL

## 2025-05-21 DIAGNOSIS — Z86.0100 HISTORY OF COLONIC POLYPS: Primary | ICD-10-CM

## 2025-05-21 NOTE — TELEPHONE ENCOUNTER
Bowel Prep Review:  Disclaimer: No call was made to the patient.     Extended Golytely bowel prep. Bowel prep sent to    [facility]/PHARMACY #[id number] - [place], [place] - [address]   Recommended due to constipation noted or reported.   Instructions were sent via dalton Lezama LPN  Endoscopy Procedure Pre Assessment

## 2025-05-22 RX ORDER — BISACODYL 5 MG/1
TABLET, DELAYED RELEASE ORAL
Qty: 4 TABLET | Refills: 0 | Status: SHIPPED | OUTPATIENT
Start: 2025-05-22

## 2025-05-22 NOTE — TELEPHONE ENCOUNTER
Pre assessment completed for upcoming procedure.   (Please see previous telephone encounter notes for complete details)      Procedure details:    Arrival time and facility location reviewed.    Pre op exam needed? No.    Designated  policy reviewed. Instructed to have someone stay 24  hours post procedure.       Medication review:    Medications reviewed. Please see supporting documentation below. Holding recommendations discussed (if applicable).       Prep for procedure:     Procedure prep instructions reviewed.        Any additional information needed:  N/A      Patient verbalized understanding and had no questions or concerns at this time.      Varsha Dumont RN  Endoscopy Procedure Pre Assessment   [phone] option 3

## 2025-05-22 NOTE — TELEPHONE ENCOUNTER
Pre visit planning completed.      Procedure details:    Patient scheduled for Colonoscopy on 6/6/25.     Arrival time: 1115. Procedure time 1215    Facility location: [facility]; [address] [place] [address], [address] Floor, [place] [address]. Check in location: 5th Floor.    Sedation type: MAC    Pre op exam needed? No.    Indication for procedure: Hx of polyps       Chart review:     Electronic implanted devices? No    Recent diagnosis of diverticulitis within the last 6 weeks? No      Medication review:    Diabetic? Prediabetic. Not on diabetic medications. (Verify)    Anticoagulants? No    Weight loss medication/injectable? No GLP-1 medication per patient's medication list. Nursing to verify with pre-assessment call.    Other medication HOLDING recommendations:  N/A      Prep for procedure:     Bowel prep recommendation: Extended Golytely. Bowel prep sent to      [facility]/PHARMACY #[id number] - [place], [place] - [address].    Pt will also need to do Miralax daily starting 7 days before the procedure per last Colonoscopy report from 2024.     Due to: constipation noted or reported    Procedure information and instructions sent via [employer]         Varsha Dumont RN  Endoscopy Procedure Pre Assessment   [phone] option 3

## 2025-06-06 ENCOUNTER — HOSPITAL ENCOUNTER (OUTPATIENT)
Facility: AMBULATORY SURGERY CENTER | Age: 75
Discharge: HOME | End: 2025-06-06
Attending: SURGERY | Admitting: SURGERY
Payer: COMMERCIAL

## 2025-06-06 VITALS
WEIGHT: 180 LBS | BODY MASS INDEX: 26.66 KG/M2 | OXYGEN SATURATION: 96 % | SYSTOLIC BLOOD PRESSURE: 132 MMHG | TEMPERATURE: 97.2 F | DIASTOLIC BLOOD PRESSURE: 84 MMHG | HEART RATE: 67 BPM | HEIGHT: 69 IN | RESPIRATION RATE: 16 BRPM

## 2025-06-06 LAB — COLONOSCOPY: NORMAL

## 2025-06-06 PROCEDURE — G0105 COLORECTAL SCRN; HI RISK IND: HCPCS

## (undated) DEVICE — SOL WATER IRRIG 500ML BOTTLE 2F7113

## (undated) DEVICE — GOWN IMPERVIOUS 2XL BLUE

## (undated) DEVICE — SUCTION MANIFOLD NEPTUNE 2 SYS 1 PORT 702-025-000

## (undated) DEVICE — KIT ENDO TURNOVER/PROCEDURE CARRY-ON 101822

## (undated) DEVICE — TUBING SUCTION MEDI-VAC 1/4"X20' N620A

## (undated) DEVICE — SPECIMEN CONTAINER 3OZ W/FORMALIN 59901